# Patient Record
Sex: FEMALE | Race: WHITE | NOT HISPANIC OR LATINO | Employment: OTHER | URBAN - METROPOLITAN AREA
[De-identification: names, ages, dates, MRNs, and addresses within clinical notes are randomized per-mention and may not be internally consistent; named-entity substitution may affect disease eponyms.]

---

## 2022-12-13 ENCOUNTER — OFFICE VISIT (OUTPATIENT)
Dept: ORTHOPEDIC SURGERY | Facility: CLINIC | Age: 82
End: 2022-12-13

## 2022-12-13 VITALS — HEIGHT: 66 IN | WEIGHT: 138.1 LBS | RESPIRATION RATE: 12 BRPM | BODY MASS INDEX: 22.19 KG/M2 | TEMPERATURE: 97.1 F

## 2022-12-13 DIAGNOSIS — R52 PAIN: Primary | ICD-10-CM

## 2022-12-13 DIAGNOSIS — M17.12 PRIMARY OSTEOARTHRITIS OF LEFT KNEE: ICD-10-CM

## 2022-12-13 PROCEDURE — 73562 X-RAY EXAM OF KNEE 3: CPT | Performed by: ORTHOPAEDIC SURGERY

## 2022-12-13 PROCEDURE — 99203 OFFICE O/P NEW LOW 30 MIN: CPT | Performed by: ORTHOPAEDIC SURGERY

## 2022-12-13 RX ORDER — NEBIVOLOL 10 MG/1
10 TABLET ORAL DAILY
COMMUNITY
Start: 2022-12-08

## 2022-12-13 RX ORDER — APIXABAN 2.5 MG/1
2.5 TABLET, FILM COATED ORAL 2 TIMES DAILY
COMMUNITY
Start: 2022-12-05

## 2022-12-13 NOTE — PROGRESS NOTES
"Patient: Micki Vera  YOB: 1940 82 y.o. female  Medical Record Number: [id number]    Chief Complaints:   Chief Complaint   Patient presents with   • Left Knee - Pain, Initial Evaluation, Edema       History of Present Illness:Micki Vera is a 82 y.o. female who presents with left knee pain -  Severe lateral stabbing - on blood thinnners, cant take ensaids, takes tylenol without much relief. Has added a home remedy ointment and says the pain level is much better    Allergies:   Allergies   Allergen Reactions   • Hydrochlorothiazide W-Triamterene Hives     \"Rash\"   • Vancomycin Rash     Can take PO   Cannot take it as IV   • Aminophylline Hives   • Clindamycin Diarrhea   • Erythromycin Hives   • Sulfa Antibiotics Hives and Rash   • Theophylline Hives       Medications:   Current Outpatient Medications   Medication Sig Dispense Refill   • B Complex Vitamins (VITAMIN B COMPLEX PO) Take  by mouth.     • Biotin 5000 MCG capsule Take  by mouth.     • Cholecalciferol 25 MCG (1000 UT) capsule Take 2,000 Units by mouth Daily.     • Eliquis 2.5 MG tablet tablet Take 2.5 mg by mouth 2 (Two) Times a Day.     • Misc Natural Products (Magic Mushroom Mix) capsule Take  by mouth.     • nebivolol (BYSTOLIC) 10 MG tablet Take 10 mg by mouth Daily.       No current facility-administered medications for this visit.         The following portions of the patient's history were reviewed and updated as appropriate: allergies, current medications, past family history, past medical history, past social history, past surgical history and problem list.    Review of Systems:   A 14 point review of systems was performed. All systems negative except pertinent positives/negative listed in HPI above    Physical Exam:   Vitals:    12/13/22 1249   Resp: 12   Temp: 97.1 °F (36.2 °C)   Weight: 62.6 kg (138 lb 1.6 oz)   Height: 167.6 cm (66\")       General: A and O x 3, ASA, NAD    SCLERA:    Normal    DENTITION:   Normal  Knee:  " left    ALIGNMENT:     Valgus      GAIT:    Antalgic    SKIN:    No abnormality, very large varicose veins anterior    RANGE OF MOTION:   5  -  105   DEG    STRENGTH:   4  / 5    LIGAMENTS:    No varus / valgus instability.   Negative  Lachman.    MENISCUS:     Negative   Parish       DISTAL PULSES:    Paplable    DISTAL SENSATION :   Intact    LYMPHATICS:     No   lymphadenopathy    OTHER:          - Positive   effusion      - Crepitance with ROM        Radiology:  Xrays 3views left knee (ap,lateral, sunrise) were ordered and reviewed for evaluation of knee pain demonstrating advanced valgus osteoarthritis with bone on bone articulation, subchondral cysts, and periarticular osteophytes. There are no previous films for comparision.    Assessment/Plan: left knee advanced OA  A total of  25 minutes was spent face to face with >13 minutes being spent in counseling and discussion of joint replacement surgery.  We discussed the risks benefits and alternatives of the procedure.  I explained the proposed surgical procedure in detail and also discussed the expected hospital course, discharge course and postoperative treatment plan in detail.I think she may be at increased risk due larghe anterior varicose veins and h/o PE - I recommend she see a vascualr specialist to see if any intervention could help address the veins before considering surgery  .      Alan Walton MD  12/13/2022

## 2023-03-24 ENCOUNTER — TELEPHONE (OUTPATIENT)
Dept: ORTHOPEDIC SURGERY | Facility: CLINIC | Age: 83
End: 2023-03-24
Payer: MEDICARE

## 2023-03-24 NOTE — TELEPHONE ENCOUNTER
Caller: Micki Vera    Relationship: Self    Best call back number:     What is the best time to reach you: ANY     Who are you requesting to speak with (clinical staff, provider,  specific staff member): CLINICAL    What was the call regarding: PATIENT SAYS VASCULAR SHOULD BE CLEAR TO MOVE FORWARD WITH LEFT KNEE SURGERY.  PLEASE CONTACT PATIENT TO DISCUSS    Do you require a callback: YES

## 2023-03-28 ENCOUNTER — PREP FOR SURGERY (OUTPATIENT)
Dept: OTHER | Facility: HOSPITAL | Age: 83
End: 2023-03-28
Payer: MEDICARE

## 2023-03-28 DIAGNOSIS — M17.12 PRIMARY OSTEOARTHRITIS OF LEFT KNEE: Primary | ICD-10-CM

## 2023-03-28 PROBLEM — M17.9 OA (OSTEOARTHRITIS) OF KNEE: Status: ACTIVE | Noted: 2023-03-28

## 2023-03-28 RX ORDER — CHLORHEXIDINE GLUCONATE 500 MG/1
CLOTH TOPICAL 2 TIMES DAILY
OUTPATIENT
Start: 2023-03-28

## 2023-03-28 RX ORDER — PREGABALIN 75 MG/1
150 CAPSULE ORAL ONCE
OUTPATIENT
Start: 2023-06-21 | End: 2023-03-28

## 2023-03-28 RX ORDER — CEFAZOLIN SODIUM 2 G/100ML
2 INJECTION, SOLUTION INTRAVENOUS ONCE
OUTPATIENT
Start: 2023-06-21 | End: 2023-03-28

## 2023-03-28 RX ORDER — MELOXICAM 15 MG/1
15 TABLET ORAL ONCE
OUTPATIENT
Start: 2023-06-21 | End: 2023-03-28

## 2023-04-27 ENCOUNTER — PRE-ADMISSION TESTING (OUTPATIENT)
Dept: PREADMISSION TESTING | Facility: HOSPITAL | Age: 83
End: 2023-04-27
Payer: MEDICARE

## 2023-04-27 ENCOUNTER — HOSPITAL ENCOUNTER (OUTPATIENT)
Dept: GENERAL RADIOLOGY | Facility: HOSPITAL | Age: 83
Discharge: HOME | End: 2023-04-27
Payer: MEDICARE

## 2023-04-27 VITALS
DIASTOLIC BLOOD PRESSURE: 95 MMHG | BODY MASS INDEX: 23.06 KG/M2 | HEART RATE: 75 BPM | RESPIRATION RATE: 16 BRPM | WEIGHT: 143.5 LBS | OXYGEN SATURATION: 100 % | HEIGHT: 66 IN | TEMPERATURE: 98.6 F | SYSTOLIC BLOOD PRESSURE: 168 MMHG

## 2023-04-27 LAB
ALBUMIN SERPL-MCNC: 4.1 G/DL (ref 3.5–5.2)
ALBUMIN/GLOB SERPL: 1.4 G/DL
ALP SERPL-CCNC: 91 U/L (ref 39–117)
ALT SERPL W P-5'-P-CCNC: 22 U/L (ref 1–33)
ANION GAP SERPL CALCULATED.3IONS-SCNC: 12.3 MMOL/L (ref 5–15)
AST SERPL-CCNC: 26 U/L (ref 1–32)
BILIRUB SERPL-MCNC: 0.2 MG/DL (ref 0–1.2)
BUN SERPL-MCNC: 16 MG/DL (ref 8–23)
BUN/CREAT SERPL: 20 (ref 7–25)
CALCIUM SPEC-SCNC: 9.8 MG/DL (ref 8.6–10.5)
CHLORIDE SERPL-SCNC: 100 MMOL/L (ref 98–107)
CO2 SERPL-SCNC: 23.7 MMOL/L (ref 22–29)
CREAT SERPL-MCNC: 0.8 MG/DL (ref 0.57–1)
DEPRECATED RDW RBC AUTO: 44 FL (ref 37–54)
EGFRCR SERPLBLD CKD-EPI 2021: 72.3 ML/MIN/1.73
ERYTHROCYTE [DISTWIDTH] IN BLOOD BY AUTOMATED COUNT: 13.3 % (ref 12.3–15.4)
GLOBULIN UR ELPH-MCNC: 2.9 GM/DL
GLUCOSE SERPL-MCNC: 108 MG/DL (ref 65–99)
HBA1C MFR BLD: 5.8 % (ref 4.8–5.6)
HCT VFR BLD AUTO: 38.4 % (ref 34–46.6)
HGB BLD-MCNC: 12.5 G/DL (ref 12–15.9)
INR PPP: 1.18 (ref 0.9–1.1)
MCH RBC QN AUTO: 29.4 PG (ref 26.6–33)
MCHC RBC AUTO-ENTMCNC: 32.6 G/DL (ref 31.5–35.7)
MCV RBC AUTO: 90.4 FL (ref 79–97)
PLATELET # BLD AUTO: 187 10*3/MM3 (ref 140–450)
PMV BLD AUTO: 10.5 FL (ref 6–12)
POTASSIUM SERPL-SCNC: 4.3 MMOL/L (ref 3.5–5.2)
PROT SERPL-MCNC: 7 G/DL (ref 6–8.5)
PROTHROMBIN TIME: 15.2 SECONDS (ref 11.7–14.2)
QT INTERVAL: 398 MS
RBC # BLD AUTO: 4.25 10*6/MM3 (ref 3.77–5.28)
SODIUM SERPL-SCNC: 136 MMOL/L (ref 136–145)
WBC NRBC COR # BLD: 7.38 10*3/MM3 (ref 3.4–10.8)

## 2023-04-27 PROCEDURE — 85027 COMPLETE CBC AUTOMATED: CPT

## 2023-04-27 PROCEDURE — 73560 X-RAY EXAM OF KNEE 1 OR 2: CPT

## 2023-04-27 PROCEDURE — 36415 COLL VENOUS BLD VENIPUNCTURE: CPT

## 2023-04-27 PROCEDURE — 85610 PROTHROMBIN TIME: CPT

## 2023-04-27 PROCEDURE — 93005 ELECTROCARDIOGRAM TRACING: CPT

## 2023-04-27 PROCEDURE — 80053 COMPREHEN METABOLIC PANEL: CPT

## 2023-04-27 PROCEDURE — 83036 HEMOGLOBIN GLYCOSYLATED A1C: CPT

## 2023-04-27 PROCEDURE — 93010 ELECTROCARDIOGRAM REPORT: CPT | Performed by: STUDENT IN AN ORGANIZED HEALTH CARE EDUCATION/TRAINING PROGRAM

## 2023-04-27 RX ORDER — CLONAZEPAM 1 MG/1
TABLET ORAL
COMMUNITY
Start: 2023-03-01

## 2023-04-27 RX ORDER — ACETAMINOPHEN AND CODEINE PHOSPHATE 300; 60 MG/1; MG/1
TABLET ORAL
COMMUNITY
Start: 2023-04-17 | End: 2023-04-27

## 2023-04-27 RX ORDER — SENNOSIDES 8.6 MG
650 CAPSULE ORAL EVERY 8 HOURS PRN
COMMUNITY

## 2023-04-27 RX ORDER — MULTIPLE VITAMINS W/ MINERALS TAB 9MG-400MCG
1 TAB ORAL DAILY
COMMUNITY

## 2023-04-27 RX ORDER — NITROGLYCERIN 0.4 MG/1
0.4 TABLET SUBLINGUAL
COMMUNITY
Start: 2022-12-27

## 2023-04-27 RX ORDER — FLUTICASONE FUROATE AND VILANTEROL 100; 25 UG/1; UG/1
1 POWDER RESPIRATORY (INHALATION)
COMMUNITY

## 2023-04-27 RX ORDER — ATORVASTATIN CALCIUM 40 MG/1
40 TABLET, FILM COATED ORAL NIGHTLY
COMMUNITY
Start: 2023-04-11 | End: 2023-04-27

## 2023-04-27 RX ORDER — LORATADINE 10 MG/1
10 TABLET ORAL DAILY
COMMUNITY

## 2023-04-27 RX ORDER — CHLORHEXIDINE GLUCONATE 500 MG/1
CLOTH TOPICAL TAKE AS DIRECTED
COMMUNITY

## 2023-04-27 RX ORDER — AMOXICILLIN 875 MG/1
TABLET, COATED ORAL
COMMUNITY
Start: 2023-04-24

## 2023-04-27 RX ORDER — SALIVA STIMULANT COMB. NO.7
GEL (GRAM) MUCOUS MEMBRANE AS NEEDED
COMMUNITY

## 2023-04-27 NOTE — DISCHARGE INSTRUCTIONS
Take the following medications the morning of surgery: NEBIVOLOL, BREO      If you are on prescription narcotic pain medication to control your pain you may also take that medication the morning of surgery.    General Instructions:  Do not eat solid food after midnight the night before surgery.  You may drink clear liquids day of surgery but must stop at least one hour before your hospital arrival time.  It is beneficial for you to have a clear drink that contains carbohydrates the day of surgery.  We suggest a 12 to 20 ounce bottle of Gatorade or Powerade for non-diabetic patients or a 12 to 20 ounce bottle of G2 or Powerade Zero for diabetic patients. (Pediatric patients, are not advised to drink a 12 to 20 ounce carbohydrate drink)    Clear liquids are liquids you can see through.  Nothing red in color.     Plain water                               Sports drinks  Sodas                                   Gelatin (Jell-O)  Fruit juices without pulp such as white grape juice and apple juice  Popsicles that contain no fruit or yogurt  Tea or coffee (no cream or milk added)  Gatorade / Powerade  G2 / Powerade Zero      Patients who avoid smoking, chewing tobacco and alcohol for 4 weeks prior to surgery have a reduced risk of post-operative complications.  Quit smoking as many days before surgery as you can.  Do not smoke, use chewing tobacco or drink alcohol the day of surgery.   If applicable bring your C-PAP/ BI-PAP machine in with you to preop day of surgery.  Bring any papers given to you in the doctor’s office.  Wear clean comfortable clothes.  Do not wear contact lenses, false eyelashes or make-up.  Bring a case for your glasses.   Bring crutches or walker if applicable.  Remove all piercings.  Leave jewelry and any other valuables at home.  Hair extensions with metal clips must be removed prior to surgery.  The Pre-Admission Testing nurse will instruct you to bring medications if unable to obtain an accurate  list in Pre-Admission Testing.          Preventing a Surgical Site Infection:  For 2 to 3 days before surgery, avoid shaving with a razor because the razor can irritate skin and make it easier to develop an infection.    Any areas of open skin can increase the risk of a post-operative wound infection by allowing bacteria to enter and travel throughout the body.  Notify your surgeon if you have any skin wounds / rashes even if it is not near the expected surgical site.  The area will need assessed to determine if surgery should be delayed until it is healed.  The night prior to surgery shower using a fresh bar of anti-bacterial soap (such as Dial) and clean washcloth.  Sleep in a clean bed with clean clothing.  Do not allow pets to sleep with you.  Shower on the morning of surgery using a fresh bar of anti-bacterial soap (such as Dial) and clean washcloth.  Dry with a clean towel and dress in clean clothing.  CHLORHEXIDINE CLOTH INSTRUCTIONS  The morning of surgery follow these instructions using the Chlorhexidine cloths you've been given.  These steps reduce bacteria on the body.  Do not use the cloths near your eyes, ears mouth, genitalia or on open wounds.  Throw the cloths away after use but do not try to flush them down a toilet.      Open and remove one cloth at a time from the package.    Leave the cloth unfolded and begin the bathing.  Massage the skin with the cloths using gentle pressure to remove bacteria.  Do not scrub harshly.   Follow the steps below with one 2% CHG cloth per area (6 total cloths).  One cloth for neck, shoulders and chest.  One cloth for both arms, hands, fingers and underarms (do underarms last).  One cloth for the abdomen followed by groin.  One cloth for right leg and foot including between the toes.  One cloth for left leg and foot including between the toes.  The last cloth is to be used for the back of the neck, back and buttocks.    Allow the CHG to air dry 3 minutes on the skin  which will give it time to work and decrease the chance of irritation.  The skin may feel sticky until it is dry.  Do not rinse with water or any other liquid or you will lose the beneficial effects of the CHG.  If mild skin irritation occurs, do rinse the skin to remove the CHG.  Report this to the nurse at time of admission.  Do not apply lotions, creams, ointments, deodorants or perfumes after using the clothes. Dress in clean clothes before coming to the hospital.     Ask your surgeon if you will be receiving antibiotics prior to surgery.  Make sure you, your family, and all healthcare providers clean their hands with soap and water or an alcohol based hand  before caring for you or your wound.    Day of surgery:  Your arrival time is approximately two hours before your scheduled surgery time.  Upon arrival, a Pre-op nurse and Anesthesiologist will review your health history, obtain vital signs, and answer questions you may have.  The only belongings needed at this time will be a list of your home medications and if applicable your C-PAP/BI-PAP machine.  A Pre-op nurse will start an IV and you may receive medication in preparation for surgery, including something to help you relax.     Please be aware that surgery does come with discomfort.  We want to make every effort to control your discomfort so please discuss any uncontrolled symptoms with your nurse.   Your doctor will most likely have prescribed pain medications.      If you are going home after surgery you will receive individualized written care instructions before being discharged.  A responsible adult must drive you to and from the hospital on the day of your surgery and stay with you for 24 hours.  Discharge prescriptions can be filled by the hospital pharmacy during regular pharmacy hours.  If you are having surgery late in the day/evening your prescription may be e-prescribed to your pharmacy.  Please verify your pharmacy hours or chose a  24 hour pharmacy to avoid not having access to your prescription because your pharmacy has closed for the day.    If you are staying overnight following surgery, you will be transported to your hospital room following the recovery period.  [facility] has all private rooms.    If you have any questions please call Pre-Admission Testing at [phone].  Deductibles and co-payments are collected on the day of service. Please be prepared to pay the required co-pay, deductible or deposit on the day of service as defined by your plan.    Call your surgeon immediately if you experience any of the following symptoms:  Sore Throat  Shortness of Breath or difficulty breathing  Cough  Chills  Body soreness or muscle pain  Headache  Fever  New loss of taste or smell  Do not arrive for your surgery ill.  Your procedure will need to be rescheduled to another time.  You will need to call your physician before the day of surgery to avoid any unnecessary exposure to hospital staff as well as other patients.

## 2023-05-01 NOTE — PAT
Here for PAT appt for scheduled LKTA with Dr Lorenz.  She has an extensive surgical hx but this is her first TJR.  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  PMH includes CAD, severe AS, pulm HTN, DVT/PE, asthma, HTN, hyperlipidemia, OA and PVD.  She has had cardiac stents as well as a TAVR and is on anti coag therapy for same.  Was cleared from a cardiac perspective by Dr Gaona (note in Care Everywhere) at high risk, but stable.  She was instructed to hold her Eliquis for 2 days prior to surgery.  Exam reveals pleasant elderly female in NAD.  Answers questions appropriately and neuro grossly intact.  Very informed on her health issues.  S1S2 RRR with murmur noted.  Chest CTA bilat although slightly harsh.  No edema noted.    All testing reviewed and cardiac notes reviewed.  Pt may proceed with planned ortho surgery.

## 2023-05-17 ENCOUNTER — APPOINTMENT (OUTPATIENT)
Dept: GENERAL RADIOLOGY | Facility: HOSPITAL | Age: 83
End: 2023-05-17
Payer: COMMERCIAL

## 2023-05-17 ENCOUNTER — ANESTHESIA EVENT (OUTPATIENT)
Dept: PERIOP | Facility: HOSPITAL | Age: 83
End: 2023-05-17
Payer: COMMERCIAL

## 2023-05-17 ENCOUNTER — HOSPITAL ENCOUNTER (OUTPATIENT)
Facility: HOSPITAL | Age: 83
Discharge: HOME HEALTH | End: 2023-05-18
Attending: ORTHOPAEDIC SURGERY | Admitting: ORTHOPAEDIC SURGERY
Payer: COMMERCIAL

## 2023-05-17 ENCOUNTER — ANESTHESIA (OUTPATIENT)
Dept: PERIOP | Facility: HOSPITAL | Age: 83
End: 2023-05-17
Payer: COMMERCIAL

## 2023-05-17 DIAGNOSIS — Z96.652 TOTAL KNEE REPLACEMENT STATUS, LEFT: Primary | ICD-10-CM

## 2023-05-17 PROCEDURE — 25010000002 CEFAZOLIN IN DEXTROSE 2-4 GM/100ML-% SOLUTION: Performed by: ORTHOPAEDIC SURGERY

## 2023-05-17 PROCEDURE — G0378 HOSPITAL OBSERVATION PER HR: HCPCS

## 2023-05-17 PROCEDURE — C1713 ANCHOR/SCREW BN/BN,TIS/BN: HCPCS | Performed by: ORTHOPAEDIC SURGERY

## 2023-05-17 PROCEDURE — 73560 X-RAY EXAM OF KNEE 1 OR 2: CPT

## 2023-05-17 PROCEDURE — 25010000002 KETOROLAC TROMETHAMINE PER 15 MG: Performed by: ORTHOPAEDIC SURGERY

## 2023-05-17 PROCEDURE — 25010000002 ONDANSETRON PER 1 MG: Performed by: ORTHOPAEDIC SURGERY

## 2023-05-17 PROCEDURE — 25010000002 ROPIVACAINE PER 1 MG: Performed by: ORTHOPAEDIC SURGERY

## 2023-05-17 PROCEDURE — 25010000002 ROPIVACAINE PER 1 MG: Performed by: STUDENT IN AN ORGANIZED HEALTH CARE EDUCATION/TRAINING PROGRAM

## 2023-05-17 PROCEDURE — 25010000002 ONDANSETRON PER 1 MG: Performed by: STUDENT IN AN ORGANIZED HEALTH CARE EDUCATION/TRAINING PROGRAM

## 2023-05-17 PROCEDURE — A9270 NON-COVERED ITEM OR SERVICE: HCPCS | Performed by: ORTHOPAEDIC SURGERY

## 2023-05-17 PROCEDURE — 25010000002 DEXAMETHASONE PER 1 MG: Performed by: STUDENT IN AN ORGANIZED HEALTH CARE EDUCATION/TRAINING PROGRAM

## 2023-05-17 PROCEDURE — 63710000001 HYDROCODONE-ACETAMINOPHEN 5-325 MG TABLET: Performed by: ORTHOPAEDIC SURGERY

## 2023-05-17 PROCEDURE — 25010000002 FENTANYL CITRATE (PF) 50 MCG/ML SOLUTION: Performed by: STUDENT IN AN ORGANIZED HEALTH CARE EDUCATION/TRAINING PROGRAM

## 2023-05-17 PROCEDURE — C1776 JOINT DEVICE (IMPLANTABLE): HCPCS | Performed by: ORTHOPAEDIC SURGERY

## 2023-05-17 PROCEDURE — 25010000002 CLONIDINE PER 1 MG: Performed by: ORTHOPAEDIC SURGERY

## 2023-05-17 PROCEDURE — 25010000002 PROPOFOL 10 MG/ML EMULSION: Performed by: STUDENT IN AN ORGANIZED HEALTH CARE EDUCATION/TRAINING PROGRAM

## 2023-05-17 DEVICE — DEV CONTRL TISS STRATAFIX SPIRAL MNCRYL UD 3/0 PLS 30CM: Type: IMPLANTABLE DEVICE | Site: KNEE | Status: FUNCTIONAL

## 2023-05-17 DEVICE — BONE WAX
Type: IMPLANTABLE DEVICE | Site: KNEE | Status: FUNCTIONAL
Brand: ETHICON

## 2023-05-17 DEVICE — PALACOS® R IS A FAST-CURING, RADIOPAQUE, POLY(METHYL METHACRYLATE)-BASED BONE CEMENT.PALACOS ® R CONTAINS THE X-RAY CONTRAST MEDIUM ZIRCONIUM DIOXIDE. TO IMPROVE VISIBILITY IN THE SURGICAL FIELD PALACOS ® R HAS BEEN COLOURED WITH CHLOROPHYLL (E141). THE BONE CEMENT IS PREPARED DIRECTLY BEFORE USE BY MIXING A POLYMER POWDER COMPONENT WITH A LIQUID MONOMER COMPONENT. A DUCTILE DOUGH FORMS WHICH CURES WITHIN A FEW MINUTES.
Type: IMPLANTABLE DEVICE | Site: KNEE | Status: FUNCTIONAL
Brand: PALACOS®

## 2023-05-17 DEVICE — TIBIAL INSERT FIXED SPHERE FLEX   #4/11 MM L E-CROSS
Type: IMPLANTABLE DEVICE | Site: KNEE | Status: FUNCTIONAL
Brand: GMK SPHERE TOTAL KNEE SYSTEM

## 2023-05-17 DEVICE — TOTL KN: Type: IMPLANTABLE DEVICE | Status: FUNCTIONAL

## 2023-05-17 DEVICE — DEV CONTRL TISS STRATAFIX PDS PLS OS6 REV SZ1 18IN 45CM: Type: IMPLANTABLE DEVICE | Site: KNEE | Status: FUNCTIONAL

## 2023-05-17 DEVICE — RESURFACING PATELLA SIZE 2
Type: IMPLANTABLE DEVICE | Site: KNEE | Status: FUNCTIONAL
Brand: GMK PRIMARY TOTAL KNEE SYSTEM

## 2023-05-17 DEVICE — TIBIAL TRAY FIXED CEMENTED SIZE T3-I4 LEFT
Type: IMPLANTABLE DEVICE | Site: KNEE | Status: FUNCTIONAL
Brand: GMK SPHERE TOTAL KNEE SYSTEM

## 2023-05-17 DEVICE — FEMUR SPHERE CEMENTED LEFT, SIZE 4
Type: IMPLANTABLE DEVICE | Site: KNEE | Status: FUNCTIONAL
Brand: GMK SPHERE TOTAL KNEE SYSTEM

## 2023-05-17 RX ORDER — LIDOCAINE HYDROCHLORIDE 20 MG/ML
INJECTION, SOLUTION EPIDURAL; INFILTRATION; INTRACAUDAL; PERINEURAL AS NEEDED
Status: DISCONTINUED | OUTPATIENT
Start: 2023-05-17 | End: 2023-05-17 | Stop reason: SURG

## 2023-05-17 RX ORDER — HYDROCODONE BITARTRATE AND ACETAMINOPHEN 5; 325 MG/1; MG/1
1 TABLET ORAL ONCE AS NEEDED
Status: DISCONTINUED | OUTPATIENT
Start: 2023-05-17 | End: 2023-05-17 | Stop reason: HOSPADM

## 2023-05-17 RX ORDER — ONDANSETRON 4 MG/1
4 TABLET, FILM COATED ORAL EVERY 6 HOURS PRN
Status: DISCONTINUED | OUTPATIENT
Start: 2023-05-17 | End: 2023-05-18 | Stop reason: HOSPADM

## 2023-05-17 RX ORDER — NEBIVOLOL 10 MG/1
10 TABLET ORAL DAILY
Status: DISCONTINUED | OUTPATIENT
Start: 2023-05-17 | End: 2023-05-18 | Stop reason: HOSPADM

## 2023-05-17 RX ORDER — HYDRALAZINE HYDROCHLORIDE 20 MG/ML
5 INJECTION INTRAMUSCULAR; INTRAVENOUS
Status: DISCONTINUED | OUTPATIENT
Start: 2023-05-17 | End: 2023-05-17 | Stop reason: HOSPADM

## 2023-05-17 RX ORDER — DROPERIDOL 2.5 MG/ML
0.62 INJECTION, SOLUTION INTRAMUSCULAR; INTRAVENOUS
Status: DISCONTINUED | OUTPATIENT
Start: 2023-05-17 | End: 2023-05-17 | Stop reason: HOSPADM

## 2023-05-17 RX ORDER — FENTANYL CITRATE 50 UG/ML
25 INJECTION, SOLUTION INTRAMUSCULAR; INTRAVENOUS ONCE AS NEEDED
Status: COMPLETED | OUTPATIENT
Start: 2023-05-17 | End: 2023-05-17

## 2023-05-17 RX ORDER — CLONAZEPAM 0.5 MG/1
0.5 TABLET ORAL NIGHTLY PRN
Status: DISCONTINUED | OUTPATIENT
Start: 2023-05-17 | End: 2023-05-18 | Stop reason: HOSPADM

## 2023-05-17 RX ORDER — HYDROCODONE BITARTRATE AND ACETAMINOPHEN 5; 325 MG/1; MG/1
2 TABLET ORAL EVERY 4 HOURS PRN
Status: DISCONTINUED | OUTPATIENT
Start: 2023-05-17 | End: 2023-05-18 | Stop reason: HOSPADM

## 2023-05-17 RX ORDER — MAGNESIUM HYDROXIDE 1200 MG/15ML
LIQUID ORAL AS NEEDED
Status: DISCONTINUED | OUTPATIENT
Start: 2023-05-17 | End: 2023-05-17 | Stop reason: HOSPADM

## 2023-05-17 RX ORDER — PREGABALIN 75 MG/1
75 CAPSULE ORAL ONCE
Status: COMPLETED | OUTPATIENT
Start: 2023-05-17 | End: 2023-05-17

## 2023-05-17 RX ORDER — SODIUM CHLORIDE 0.9 % (FLUSH) 0.9 %
3 SYRINGE (ML) INJECTION EVERY 12 HOURS SCHEDULED
Status: DISCONTINUED | OUTPATIENT
Start: 2023-05-17 | End: 2023-05-17 | Stop reason: HOSPADM

## 2023-05-17 RX ORDER — MELATONIN
2000 DAILY
Status: DISCONTINUED | OUTPATIENT
Start: 2023-05-17 | End: 2023-05-18 | Stop reason: HOSPADM

## 2023-05-17 RX ORDER — BUPIVACAINE HYDROCHLORIDE 7.5 MG/ML
INJECTION, SOLUTION EPIDURAL; RETROBULBAR
Status: COMPLETED | OUTPATIENT
Start: 2023-05-17 | End: 2023-05-17

## 2023-05-17 RX ORDER — CELECOXIB 200 MG/1
200 CAPSULE ORAL ONCE
Status: COMPLETED | OUTPATIENT
Start: 2023-05-17 | End: 2023-05-17

## 2023-05-17 RX ORDER — DIPHENHYDRAMINE HYDROCHLORIDE 50 MG/ML
12.5 INJECTION INTRAMUSCULAR; INTRAVENOUS
Status: DISCONTINUED | OUTPATIENT
Start: 2023-05-17 | End: 2023-05-17 | Stop reason: HOSPADM

## 2023-05-17 RX ORDER — IPRATROPIUM BROMIDE AND ALBUTEROL SULFATE 2.5; .5 MG/3ML; MG/3ML
3 SOLUTION RESPIRATORY (INHALATION) ONCE AS NEEDED
Status: DISCONTINUED | OUTPATIENT
Start: 2023-05-17 | End: 2023-05-17 | Stop reason: HOSPADM

## 2023-05-17 RX ORDER — CETIRIZINE HYDROCHLORIDE 10 MG/1
10 TABLET ORAL DAILY
Status: DISCONTINUED | OUTPATIENT
Start: 2023-05-17 | End: 2023-05-18 | Stop reason: HOSPADM

## 2023-05-17 RX ORDER — TRANEXAMIC ACID 100 MG/ML
INJECTION, SOLUTION INTRAVENOUS AS NEEDED
Status: DISCONTINUED | OUTPATIENT
Start: 2023-05-17 | End: 2023-05-17 | Stop reason: SURG

## 2023-05-17 RX ORDER — ONDANSETRON 2 MG/ML
4 INJECTION INTRAMUSCULAR; INTRAVENOUS EVERY 6 HOURS PRN
Status: DISCONTINUED | OUTPATIENT
Start: 2023-05-17 | End: 2023-05-18 | Stop reason: HOSPADM

## 2023-05-17 RX ORDER — SODIUM CHLORIDE 0.9 % (FLUSH) 0.9 %
3-10 SYRINGE (ML) INJECTION AS NEEDED
Status: DISCONTINUED | OUTPATIENT
Start: 2023-05-17 | End: 2023-05-17 | Stop reason: HOSPADM

## 2023-05-17 RX ORDER — CEFAZOLIN SODIUM 2 G/100ML
2 INJECTION, SOLUTION INTRAVENOUS ONCE
Status: COMPLETED | OUTPATIENT
Start: 2023-05-17 | End: 2023-05-17

## 2023-05-17 RX ORDER — PROMETHAZINE HYDROCHLORIDE 25 MG/1
25 TABLET ORAL ONCE AS NEEDED
Status: DISCONTINUED | OUTPATIENT
Start: 2023-05-17 | End: 2023-05-17 | Stop reason: HOSPADM

## 2023-05-17 RX ORDER — NITROGLYCERIN 0.4 MG/1
0.4 TABLET SUBLINGUAL
Status: DISCONTINUED | OUTPATIENT
Start: 2023-05-17 | End: 2023-05-18 | Stop reason: HOSPADM

## 2023-05-17 RX ORDER — NALOXONE HCL 0.4 MG/ML
0.1 VIAL (ML) INJECTION
Status: DISCONTINUED | OUTPATIENT
Start: 2023-05-17 | End: 2023-05-18 | Stop reason: HOSPADM

## 2023-05-17 RX ORDER — PREGABALIN 75 MG/1
150 CAPSULE ORAL ONCE
Status: DISCONTINUED | OUTPATIENT
Start: 2023-05-17 | End: 2023-05-17

## 2023-05-17 RX ORDER — SODIUM CHLORIDE 0.9 % (FLUSH) 0.9 %
3 SYRINGE (ML) INJECTION EVERY 12 HOURS SCHEDULED
Status: DISCONTINUED | OUTPATIENT
Start: 2023-05-17 | End: 2023-05-17

## 2023-05-17 RX ORDER — BUDESONIDE AND FORMOTEROL FUMARATE DIHYDRATE 160; 4.5 UG/1; UG/1
1 AEROSOL RESPIRATORY (INHALATION)
Status: DISCONTINUED | OUTPATIENT
Start: 2023-05-17 | End: 2023-05-18 | Stop reason: HOSPADM

## 2023-05-17 RX ORDER — EPHEDRINE SULFATE 50 MG/ML
5 INJECTION, SOLUTION INTRAVENOUS ONCE AS NEEDED
Status: DISCONTINUED | OUTPATIENT
Start: 2023-05-17 | End: 2023-05-17 | Stop reason: HOSPADM

## 2023-05-17 RX ORDER — CEFAZOLIN SODIUM 2 G/100ML
2 INJECTION, SOLUTION INTRAVENOUS EVERY 8 HOURS
Status: COMPLETED | OUTPATIENT
Start: 2023-05-17 | End: 2023-05-18

## 2023-05-17 RX ORDER — SODIUM CHLORIDE, SODIUM LACTATE, POTASSIUM CHLORIDE, CALCIUM CHLORIDE 600; 310; 30; 20 MG/100ML; MG/100ML; MG/100ML; MG/100ML
9 INJECTION, SOLUTION INTRAVENOUS CONTINUOUS
Status: DISCONTINUED | OUTPATIENT
Start: 2023-05-17 | End: 2023-05-17

## 2023-05-17 RX ORDER — SODIUM CHLORIDE 9 MG/ML
100 INJECTION, SOLUTION INTRAVENOUS CONTINUOUS
Status: DISCONTINUED | OUTPATIENT
Start: 2023-05-17 | End: 2023-05-18 | Stop reason: HOSPADM

## 2023-05-17 RX ORDER — ACETAMINOPHEN 500 MG
1000 TABLET ORAL ONCE
Status: COMPLETED | OUTPATIENT
Start: 2023-05-17 | End: 2023-05-17

## 2023-05-17 RX ORDER — FENTANYL CITRATE 50 UG/ML
25 INJECTION, SOLUTION INTRAMUSCULAR; INTRAVENOUS
Status: DISCONTINUED | OUTPATIENT
Start: 2023-05-17 | End: 2023-05-17 | Stop reason: HOSPADM

## 2023-05-17 RX ORDER — TRAMADOL HYDROCHLORIDE 50 MG/1
50 TABLET ORAL EVERY 8 HOURS PRN
Status: DISCONTINUED | OUTPATIENT
Start: 2023-05-17 | End: 2023-05-18 | Stop reason: HOSPADM

## 2023-05-17 RX ORDER — ROPIVACAINE HYDROCHLORIDE 5 MG/ML
INJECTION, SOLUTION EPIDURAL; INFILTRATION; PERINEURAL
Status: COMPLETED | OUTPATIENT
Start: 2023-05-17 | End: 2023-05-17

## 2023-05-17 RX ORDER — PROMETHAZINE HYDROCHLORIDE 25 MG/1
25 SUPPOSITORY RECTAL ONCE AS NEEDED
Status: DISCONTINUED | OUTPATIENT
Start: 2023-05-17 | End: 2023-05-17 | Stop reason: HOSPADM

## 2023-05-17 RX ORDER — SODIUM CHLORIDE 0.9 % (FLUSH) 0.9 %
3-10 SYRINGE (ML) INJECTION AS NEEDED
Status: DISCONTINUED | OUTPATIENT
Start: 2023-05-17 | End: 2023-05-17

## 2023-05-17 RX ORDER — LABETALOL HYDROCHLORIDE 5 MG/ML
5 INJECTION, SOLUTION INTRAVENOUS
Status: DISCONTINUED | OUTPATIENT
Start: 2023-05-17 | End: 2023-05-17 | Stop reason: HOSPADM

## 2023-05-17 RX ORDER — HYDROMORPHONE HYDROCHLORIDE 1 MG/ML
0.25 INJECTION, SOLUTION INTRAMUSCULAR; INTRAVENOUS; SUBCUTANEOUS
Status: DISCONTINUED | OUTPATIENT
Start: 2023-05-17 | End: 2023-05-17 | Stop reason: HOSPADM

## 2023-05-17 RX ORDER — NALOXONE HCL 0.4 MG/ML
0.2 VIAL (ML) INJECTION AS NEEDED
Status: DISCONTINUED | OUTPATIENT
Start: 2023-05-17 | End: 2023-05-17 | Stop reason: HOSPADM

## 2023-05-17 RX ORDER — DOCUSATE SODIUM 100 MG/1
100 CAPSULE, LIQUID FILLED ORAL 2 TIMES DAILY PRN
Status: DISCONTINUED | OUTPATIENT
Start: 2023-05-17 | End: 2023-05-18 | Stop reason: HOSPADM

## 2023-05-17 RX ORDER — LIDOCAINE HYDROCHLORIDE 10 MG/ML
0.5 INJECTION, SOLUTION EPIDURAL; INFILTRATION; INTRACAUDAL; PERINEURAL ONCE AS NEEDED
Status: DISCONTINUED | OUTPATIENT
Start: 2023-05-17 | End: 2023-05-17

## 2023-05-17 RX ORDER — HYDROCODONE BITARTRATE AND ACETAMINOPHEN 5; 325 MG/1; MG/1
1 TABLET ORAL EVERY 4 HOURS PRN
Status: DISCONTINUED | OUTPATIENT
Start: 2023-05-17 | End: 2023-05-18 | Stop reason: HOSPADM

## 2023-05-17 RX ORDER — HYDROMORPHONE HYDROCHLORIDE 1 MG/ML
0.5 INJECTION, SOLUTION INTRAMUSCULAR; INTRAVENOUS; SUBCUTANEOUS
Status: DISCONTINUED | OUTPATIENT
Start: 2023-05-17 | End: 2023-05-18 | Stop reason: HOSPADM

## 2023-05-17 RX ORDER — ONDANSETRON 2 MG/ML
4 INJECTION INTRAMUSCULAR; INTRAVENOUS ONCE AS NEEDED
Status: DISCONTINUED | OUTPATIENT
Start: 2023-05-17 | End: 2023-05-17 | Stop reason: HOSPADM

## 2023-05-17 RX ORDER — ONDANSETRON 2 MG/ML
INJECTION INTRAMUSCULAR; INTRAVENOUS AS NEEDED
Status: DISCONTINUED | OUTPATIENT
Start: 2023-05-17 | End: 2023-05-17 | Stop reason: SURG

## 2023-05-17 RX ORDER — LIDOCAINE HYDROCHLORIDE 10 MG/ML
0.5 INJECTION, SOLUTION EPIDURAL; INFILTRATION; INTRACAUDAL; PERINEURAL ONCE AS NEEDED
Status: DISCONTINUED | OUTPATIENT
Start: 2023-05-17 | End: 2023-05-17 | Stop reason: HOSPADM

## 2023-05-17 RX ORDER — DEXAMETHASONE SODIUM PHOSPHATE 4 MG/ML
INJECTION, SOLUTION INTRA-ARTICULAR; INTRALESIONAL; INTRAMUSCULAR; INTRAVENOUS; SOFT TISSUE
Status: COMPLETED | OUTPATIENT
Start: 2023-05-17 | End: 2023-05-17

## 2023-05-17 RX ORDER — FLUMAZENIL 0.1 MG/ML
0.2 INJECTION INTRAVENOUS AS NEEDED
Status: DISCONTINUED | OUTPATIENT
Start: 2023-05-17 | End: 2023-05-17 | Stop reason: HOSPADM

## 2023-05-17 RX ORDER — PROPOFOL 10 MG/ML
VIAL (ML) INTRAVENOUS CONTINUOUS PRN
Status: DISCONTINUED | OUTPATIENT
Start: 2023-05-17 | End: 2023-05-17 | Stop reason: SURG

## 2023-05-17 RX ORDER — HYDROCODONE BITARTRATE AND ACETAMINOPHEN 7.5; 325 MG/1; MG/1
1 TABLET ORAL EVERY 4 HOURS PRN
Status: DISCONTINUED | OUTPATIENT
Start: 2023-05-17 | End: 2023-05-17 | Stop reason: HOSPADM

## 2023-05-17 RX ADMIN — SODIUM CHLORIDE, POTASSIUM CHLORIDE, SODIUM LACTATE AND CALCIUM CHLORIDE 9 ML/HR: 600; 310; 30; 20 INJECTION, SOLUTION INTRAVENOUS at 11:57

## 2023-05-17 RX ADMIN — ONDANSETRON 4 MG: 2 INJECTION INTRAMUSCULAR; INTRAVENOUS at 16:24

## 2023-05-17 RX ADMIN — PROPOFOL 20 MG: 10 INJECTION, EMULSION INTRAVENOUS at 14:44

## 2023-05-17 RX ADMIN — PROPOFOL 75 MCG/KG/MIN: 10 INJECTION, EMULSION INTRAVENOUS at 14:45

## 2023-05-17 RX ADMIN — BUPIVACAINE HYDROCHLORIDE 1.2 ML: 7.5 INJECTION, SOLUTION EPIDURAL; RETROBULBAR at 14:38

## 2023-05-17 RX ADMIN — CEFAZOLIN SODIUM 2 G: 2 INJECTION, SOLUTION INTRAVENOUS at 14:45

## 2023-05-17 RX ADMIN — LIDOCAINE HYDROCHLORIDE 40 MG: 20 INJECTION, SOLUTION EPIDURAL; INFILTRATION; INTRACAUDAL; PERINEURAL at 14:44

## 2023-05-17 RX ADMIN — PREGABALIN 75 MG: 75 CAPSULE ORAL at 11:39

## 2023-05-17 RX ADMIN — ONDANSETRON HYDROCHLORIDE 4 MG: 2 SOLUTION INTRAMUSCULAR; INTRAVENOUS at 21:42

## 2023-05-17 RX ADMIN — CEFAZOLIN SODIUM 2 G: 2 INJECTION, SOLUTION INTRAVENOUS at 23:20

## 2023-05-17 RX ADMIN — CEFAZOLIN SODIUM 2 G: 2 INJECTION, SOLUTION INTRAVENOUS at 14:16

## 2023-05-17 RX ADMIN — TRANEXAMIC ACID 1000 MG: 1 INJECTION, SOLUTION INTRAVENOUS at 14:45

## 2023-05-17 RX ADMIN — ROPIVACAINE HYDROCHLORIDE 14 ML: 5 INJECTION, SOLUTION EPIDURAL; INFILTRATION; PERINEURAL at 12:32

## 2023-05-17 RX ADMIN — HYDROCODONE BITARTRATE AND ACETAMINOPHEN 1 TABLET: 5; 325 TABLET ORAL at 21:42

## 2023-05-17 RX ADMIN — SODIUM CHLORIDE 100 ML/HR: 9 INJECTION, SOLUTION INTRAVENOUS at 21:42

## 2023-05-17 RX ADMIN — CELECOXIB 200 MG: 200 CAPSULE ORAL at 11:39

## 2023-05-17 RX ADMIN — SODIUM CHLORIDE, POTASSIUM CHLORIDE, SODIUM LACTATE AND CALCIUM CHLORIDE 9 ML/HR: 600; 310; 30; 20 INJECTION, SOLUTION INTRAVENOUS at 12:35

## 2023-05-17 RX ADMIN — ACETAMINOPHEN 1000 MG: 500 TABLET ORAL at 11:39

## 2023-05-17 RX ADMIN — FENTANYL CITRATE 25 MCG: 50 INJECTION, SOLUTION INTRAMUSCULAR; INTRAVENOUS at 12:30

## 2023-05-17 RX ADMIN — DEXAMETHASONE SODIUM PHOSPHATE 4 MG: 4 INJECTION, SOLUTION INTRAMUSCULAR; INTRAVENOUS at 12:32

## 2023-05-17 NOTE — ANESTHESIA PREPROCEDURE EVALUATION
Anesthesia Evaluation     Patient summary reviewed and Nursing notes reviewed   no history of anesthetic complications:  NPO Solid Status: > 8 hours  NPO Liquid Status: > 2 hours           Airway   Mallampati: II  TM distance: >3 FB  Neck ROM: full  Dental      Pulmonary    (+) pulmonary embolism, asthma,  Cardiovascular     ECG reviewed  PT is on anticoagulation therapy    (+) hypertension, CAD, DVT, hyperlipidemia,     ROS comment: S/p TAVR    Neuro/Psych  GI/Hepatic/Renal/Endo      Musculoskeletal     Abdominal    Substance History      OB/GYN          Other   arthritis,                    Anesthesia Plan    ASA 3     spinal     (Patient has hx of aortic stenosis s/p TAVR, doing well functionally and echo appears mostly normal following procedure. She has stopped her eliquis >72hrs and thus it is safe to proceed with spinal anesthesia. )  intravenous induction     Anesthetic plan, risks, benefits, and alternatives have been provided, discussed and informed consent has been obtained with: patient.        CODE STATUS:

## 2023-05-17 NOTE — OP NOTE
Danny Lorenz MD  Left TOTAL KNEE ARTHROPLASTY  Procedure Note    Micki Vera  5/17/2023    Pre-op Diagnosis: Osteoarthritis left knee primary generalized, severe acquired genu valgum and flexion contracture  Post-op Diagnosis:Same  Procedure: Left total Knee Arthroplasty cpt 30971  Surgical Approach: Knee Medial Parapatellar  Surgeon:  Danny Lorenz MD  Assistant:    Evelio Mayorga NP  The services of a first assist were necessary for performing the procedure safely and expeditiously.  The first assist was present for the entire duration of the case and helped with positioning, retraction and closure of the incision.    Anesthesia: Spinal, Anesthesiologist: Marlo Ferraro MD  CRNA: Lissa Reese CRNA  Staff: Circulator: Christiane Toscano RN  Scrub Person: Del Ledesma  Vendor Representative: Anup Dorado; Wilner Solano (Sky)  Assistant: Evelio Maoyrga APRN CFA  Estimated Blood Loss: minimal  Specimens: * No orders in the log *  Drains: none  Complications: None    Components Utilized:   Implant Name Type Inv. Item Serial No.  Lot No. LRB No. Used Action   CMT BONE PALACOS R HI/VISC 1X40 - [id number] Implant CMT BONE PALACOS R HI/VISC 1X40  [facility] [id number] Left 2 Implanted   DEV CONTRL TISS STRATAFIX PDS PLS OS6 REV SZ1 18IN 45CM - [id number] Implant DEV CONTRL TISS STRATAFIX PDS PLS OS6 REV SZ1 18IN 45CM  [employer]  DIV OF J AND J [id number] Left 1 Implanted   DEV CONTRL TISS STRATAFIX SPIRAL MNCRYL UD 3/0 PLS 30CM - [id number] Implant DEV CONTRL TISS STRATAFIX SPIRAL MNCRYL UD 3/0 PLS 30CM  [employer]  DIV OF J AND J [id number] Left 1 Implanted   COMP FEM/KN GMKSPHERE [facility] COCR SZ4 LT - [id number] Implant COMP FEM/KN GMKSPHERE CMT COCR SZ4 LT  [employer] [id number] Left 1 Implanted   COMP PAT/RESRF GMK SHELLY [facility] SZ2 - [id number] Implant COMP PAT/RESRF GMK SHELLY [facility] SZ2  [employer] [id number] Left 1 Implanted   TRY TIB GMK FIX [facility] [id number] LT -  [id number] Implant TRY TIB GMK FIX CMT [id number] LT  [employer] [facility] [id number] Left 1 Implanted   WAX BONE HEMO SILVANO 2.5G - [id number] Implant WAX BONE HEMO SILVANO 2.5G  [employer]  [employer] [id number] Left 1 Implanted   INSRT TIB/KN GMK/SPHERE FLX SZ4 11MM LT - [id number] Implant INSRT TIB/KN GMK/SPHERE FLX SZ4 11MM LT  [employer] [facility] [id number] Left 1 Implanted         Indication for Procedure:   The patient is a 83 y.o. female presents today for a total knee arthroplasty procedure because of failure to conservatively manage the patient's pain for arthritis.  The patient was educated in risks of surgery that could include possible risk of infection, deep venous thrombosis, pulmonary embolism, fracture, neurovascular injury, leg length discrepancy, dislocation, possible persistent pain, need for additional surgeries, anesthetic risks, medical risks including heart attack and stroke, and death.  The discussion occurred in the office pre-operatively, and patient had the opportunity to ask questions, and concerns about the proposed surgery.  The patient also understood that medicine is not an exact science, and that outcomes of the surgical procedure may be less than desired.  The patient was noted to be high risk of perioperative complications including DVT and pulmonary embolism given her history.  Additionally she has large varicose veins on the anterior aspect of her knee.  After thorough risk and benefit discussion, her impaired mobility had progressed to the point that she decided that she wished to proceed with operative intervention.    Protocols for intravenous antibiotics and venous thrombosis were followed for this patient.  IV antibiotics were infused prior to surgery and will be discontinued within 24 hours of completion of the surgical procedure.  Thrombosis prophylaxis will be initiated within 24 hours of the completion of the surgical procedure.      Procedure:   After the patient was identified in the preoperative  area, and the surgical site confirmed and marked, the patient was brought to the operating room on a stretcher.  They were placed supine on the operating room table and the above anesthetic was placed uneventfully.  A time-out procedure was performed.  The intravenous antibiotics infusion was completed.  A non-sterile tourniquet was placed on the operative thigh, and was prepped and draped in the usual sterile fashion.  An Esmarch was to exsanguinate the limb, and the tourniquet was inflated.  The patient's knee was examined she had some instability at terminal extension she had about a 15 degree flexion contracture and approximately 10 degrees of fixed valgus deformity.   A 10 blade scalpel was used to make a longitudinal incision from above the patella to medial to the tibial tubercle.  A medial parapatellar arthrotomy was performed with another 10 blade scalpel.  The fat pat was preserved and used as a medial retractor.  The medial joint line was elevated subperiosteally with electrocautery and a Reed elevator.   The patella was everted and a lateral denervation was performed as well as a lateral facet ostectomy.  The patella was then everted and the resection was made of approximately 9 mm.  The patella was then positioned with a size 2 trial in a medialized position after lug holes were drilled.  This was performed to assist with patellar tracking.  The knee was then flexed and Borden's line was drawn on the distal femur with electrocautery.  Then the drill was placed into the distal femur into the medullary canal.  The cartilage was removed from the medial femoral condyle.  The intramedullary distal femoral valgus cutting guide set to 5 degrees of femoral valgus was then placed into the medullary canal.  It was then pinned and the oscillating saw was used to make the osteotomy.    Then the extramedullary cutting guide was placed aligned with the tibial eminence superiorly and the tibial shaft distally, and  the cut was aligned for a neutral cut along the mechanical axis.  The oscillating saw was then used to complete the osteotomy cuts.     The knee was then placed in extension with a 10 mm spacer block.  The extension gap was assessed for both symmetry medially and laterally as well as for overall level of resection both at the tibia and femur.  At this point, I was satisfied with the resections of both the tibia and the femur.  I proceeded on towards the femoral sizer.  A posterior referencing femoral sizer was set at 3 degrees of external rotation.  The femur was sized pins were placed and then an all-in-one cutting block was placed.  I confirmed appropriate anterior posterior dimensions using an kisha wing as well as medial lateral dimensions.  All resections were made using the sagittal saw as well as a reciprocating saw for the trochlear cut.  Bone fragments were removed.  Medial and lateral periarticular osteophyte remnants were removed.   A laminar  was then placed medially and then laterally to remove the medial and lateral meniscus and the posterior osteophytes.  At this point the posterior cruciate ligament was intact and preserved.  The posterior medial and posterior lateral capsule was preserved.  A spacer block was placed and the knee at flexion and the knee was balanced in flexion for a medial pivot.  The knee was then placed in extension and confirmed to be balanced as well.  At this point the femoral trial was applied the tibial baseplate was inserted and the tibia was floated to assess rotation.  The knee was examined again was stable throughout range of motion.   The lug holes were drilled in the distal femur.  The tibia was drilled and broached in the typical fashion.  The trial components were then removed and the final implants were confirmed and opened.  The bone surfaces were then irrigated and dried.  Periarticular injection was placed in the posterior capsule Palacos cement was then  mixed and placed on the cut bone surfaces and back side of the implants.  The implants were then impacted into place, and the trial polyethylene spacer was placed and the knee was placed into extension.  A stack of towels was placed under the ankle and the cement was allowed to harden.  Once the cement had cured the knee was again ranged and confirmed to be balanced and have excellent range of motion.  The definitive tibial insert was placed, which was size 11. The tourniquet was then dropped.  Hemostasis was obtained.  The wound was then irrigated with the pulse lavage irrigation system with a 3 liter mixture of 0.35% Betadine and normal saline.  The local mixture was injected throughout the knee for postoperative pain control.   The arthrotomy was then closed..  #2 Ethibond sutures were used as stay sutures to close the arthrotomy followed by #1 Stratafix PDS.  The deep dermis was closed with 2-0 Vicryl.  The skin was closed using 3-0 Monocryl as well as skin glue.. Sterile silver impregnated occlusive dressings were applied.   Sponge and needle counts were completed and were correct.  The patient was awakened from anesthetic and was returned to recovery in stable condition.    The patient will receive routine postoperative antibiotic prophylaxis with Ancef.  The patient will be placed back on her home dose of Eliquis postoperatively in conjunction with SCDs.    Danny Lorenz MD  Orthopaedic Surgeon    [facility]  [phone]          Date: 5/17/2023  Time: 16:19 EDT

## 2023-05-17 NOTE — ANESTHESIA PROCEDURE NOTES
Spinal Block      Patient reassessed immediately prior to procedure    Patient location during procedure: OR  Start Time: 5/17/2023 2:30 PM  Stop Time: 5/17/2023 2:38 PM  Indication:at surgeon's request  Performed By  Anesthesiologist: Marlo Ferraro MD  Preanesthetic Checklist  Completed: patient identified, IV checked, site marked, risks and benefits discussed, surgical consent, monitors and equipment checked, pre-op evaluation and timeout performed  Spinal Block Prep:  Patient Position:sitting  Sterile Tech:cap, gloves, gown, mask and sterile barriers  Prep:Chloraprep  Patient Monitoring:blood pressure monitoring, continuous pulse oximetry and EKG    Spinal Block Procedure  Approach:midline  Guidance:landmark technique  Location:L3-L4  Needle Type:Homero  Needle Gauge:25 G  Paresthesia: no  Fluid Appearance:clear  Medications: bupivacaine PF (MARCAINE) 0.75 % injection - Spinal   1.2 mL - 5/17/2023 2:38:00 PM   Post Assessment  Patient Tolerance:patient tolerated the procedure well with no apparent complications  Complications no

## 2023-05-17 NOTE — ANESTHESIA POSTPROCEDURE EVALUATION
Patient: Micki Vera    Procedure Summary     Date: 05/17/23 Room / Location: [facility] OR [address] [place] [address] [facility]    Anesthesia Start: 1420 Anesthesia Stop: 1640    Procedure: TOTAL KNEE ARTHROPLASTY (Left: Knee) Diagnosis:     Surgeons: Danny Lorenz MD Provider: Marlo Ferraro MD    Anesthesia Type: spinal ASA Status: 3          Anesthesia Type: spinal    Vitals  Vitals Value Taken Time   /77 05/17/23 1800   Temp 36.4 °C (97.5 °F) 05/17/23 1800   Pulse 59 05/17/23 1806   Resp 12 05/17/23 1800   SpO2 100 % 05/17/23 1806   Vitals shown include unvalidated device data.        Post Anesthesia Care and Evaluation    Level of consciousness: awake and alert  Pain management: adequate    Airway patency: patent  Anesthetic complications: No anesthetic complications  PONV Status: none  Cardiovascular status: acceptable  Respiratory status: acceptable  Hydration status: acceptable    Comments: /77   Pulse 60   Temp 36.4 °C (97.5 °F) (Oral)   Resp 12   SpO2 100%

## 2023-05-17 NOTE — ANESTHESIA PROCEDURE NOTES
Peripheral Block    Pre-sedation assessment completed: 5/17/2023 12:31 PM    Patient reassessed immediately prior to procedure    Patient location during procedure: pre-op  Start time: 5/17/2023 12:32 PM  Stop time: 5/17/2023 12:34 PM  Reason for block: at surgeon's request and post-op pain management  Performed by  Anesthesiologist: Marlo Ferraro MD  Preanesthetic Checklist  Completed: patient identified, IV checked, site marked, risks and benefits discussed, surgical consent, monitors and equipment checked, pre-op evaluation and timeout performed  Prep:  Pt Position: supine  Sterile barriers:mask, gloves and cap  Prep: ChloraPrep  Patient monitoring: blood pressure monitoring, continuous pulse oximetry and EKG  Procedure    Sedation: yes  Performed under: local infiltration  Guidance:ultrasound guided    ULTRASOUND INTERPRETATION.  Using ultrasound guidance a 21 G gauge needle was placed in close proximity to the nerve, at which point, under ultrasound guidance anesthetic was injected in the area of the nerve and spread of the anesthesia was seen on ultrasound in close proximity thereto.  There were no abnormalities seen on ultrasound; a digital image was taken; and the patient tolerated the procedure with no complications. Images:still images obtained, printed/placed on chart    Block Type:adductor canal block  Injection Technique:single-shot  Needle Type:echogenic and Tuohy  Needle Gauge:21 G  Resistance on Injection: none    Medications Used: dexamethasone (DECADRON) injection - Injection   4 mg - 5/17/2023 12:32:00 PM  ropivacaine (NAROPIN) 0.5 % injection - Injection   14 mL - 5/17/2023 12:32:00 PM      Post Assessment  Injection Assessment: negative aspiration for heme, no paresthesia on injection and incremental injection  Patient Tolerance:comfortable throughout block  Complications:no  Additional Notes  Ultrasound guidance used to visualize nerve anatomy, guide needle placement and verify local  anesthetic disbursement.

## 2023-05-17 NOTE — H&P
"        ORTHOPEDIC SURGERY PRE-OP HISTORY AND PHYSICAL      Patient: Micki Vera  Date of Admission: 5/17/2023 10:46 AM  YOB: 1940  Medical Record Number: [id number]  Attending Physician: Danny Lorenz MD  Consulting Physician: Danny Lorenz MD    CHIEF COMPLAINT: Left Knee Pain.    HISTORY OF PRESENT ILLNESS: Patient is a 83 y.o. female presents to [facility] with above complaints.  The patient failed conservative treatment and patient requested surgical intervention.  The patient presents for a  Left total knee.  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  This has progressed to the point that she has had several falls because of instability in her knee.  Additionally, she has risk factors including history of pulmonary embolism.  She is chronically anticoagulated.  She has been off of her Eliquis for 3 days now    ALLERGIES:   Allergies   Allergen Reactions   • Hydrochlorothiazide W-Triamterene Hives     \"Rash\"   • Statins Other (See Comments)     myalgias   • Vancomycin Rash     Can take PO   Cannot take it as IV   • Aminophylline Hives   • Clindamycin Diarrhea   • Erythromycin Hives   • Sulfa Antibiotics Hives and Rash   • Theophylline Hives       HOME MEDICATIONS:  Medications Prior to Admission   Medication Sig Dispense Refill Last Dose   • acetaminophen (TYLENOL) 650 MG 8 hr tablet Take 1 tablet by mouth Every 8 (Eight) Hours As Needed for Mild Pain.   5/17/2023   • Chlorhexidine Gluconate Cloth 2 % pads Apply  topically Take As Directed. PRIOR TO SURGERY   5/17/2023   • clonazePAM (KlonoPIN) 1 MG tablet TAKE 0.5 TABLETS BY MOUTH NIGHTLY AS NEEDED FOR ANXIETY (OR SLEEP). MAX DAILY AMOUNT: 0.5 MG   5/16/2023   • dry mouth gel (BIOTENE ORALBALANCE) gel Apply  to the mouth or throat As Needed for Dry Mouth.   5/16/2023   • Fluticasone Furoate-Vilanterol 100-25 MCG/ACT aerosol powder  Inhale 1 puff Daily.   5/17/2023   • loratadine (CLARITIN) 10 MG " tablet Take 1 tablet by mouth Daily.   5/16/2023   • nebivolol (BYSTOLIC) 10 MG tablet Take 1 tablet by mouth Daily.   5/17/2023 at 0730   • polyethyl glycol-propyl glycol (SYSTANE) 0.4-0.3 % solution ophthalmic solution (artificial tears) Administer 1 drop to both eyes 3 (Three) Times a Day.   5/16/2023   • amoxicillin (AMOXIL) 875 MG tablet TAKE 1 TABLET BY MOUTH EVERY 12 HOURS UNTIL GONE      • B Complex Vitamins (VITAMIN B COMPLEX PO) Take  by mouth.   5/10/2023   • Cholecalciferol 25 MCG (1000 UT) capsule Take 2 capsules by mouth Daily.   5/10/2023   • Eliquis 2.5 MG tablet tablet Take 1 tablet by mouth 2 (Two) Times a Day. HOLD 2 DAYS PRIOR SURGERY PER MD INSTRUCTIONS   5/14/2023   • Homeopathic Products (AZO Yeast Plus) tablet Take  by mouth Take As Directed.      • Misc Natural Products (Magic Mushroom Mix) capsule Take 2 tablets by mouth Daily. HOLD 14 DAYS PRIOR TO SURGERY   5/3/2023   • multivitamin with minerals tablet tablet Take 1 tablet by mouth Daily. HOLD PRIOR TO SURGERY 7 DAYS   5/10/2023   • nitroglycerin (NITROSTAT) 0.4 MG SL tablet Place 1 tablet under the tongue.   More than a month   • Unable to find 1 each Take As Directed. HOMEOPATHIC SOOTHING NERVE FOAM FOR NEUROPATHIC FOOT PAIN          Past Medical History:   Diagnosis Date   • Anesthesia complication 2018    BREATHING DIFFICULTIES S/P BREAST SURGERY-TROUBLE WEANING OFF VENT   • Aortic valve disorder    • Asthma    • Breast cancer     RADIATION RIGHT; RADIATION AND CHEMO LEFT(HR2-)   • Coronary artery disease    • Female bladder prolapse     URINE LEAKAGE   • History of COVID-19 02/02/2023   • Hyperlipidemia    • Hypertension    • Jaw pain     RIGHT-FOLLOWED BY PCP   • Osteoarthritis    • Peripheral neuropathy    • Poisoning by stingray 2005    RIGHT SECOND TOE   • Pulmonary emboli 2009   • Rotator cuff injury     RIGHT   • Stye     FOLLOWED BY STEFFANY   • Thyroid nodule    • Varicose vein of leg      Past Surgical History:    Procedure Laterality Date   • AORTIC VALVE REPAIR/REPLACEMENT     • BREAST SURGERY Bilateral    • CARDIAC CATHETERIZATION      STENTS X 2   • EYE SURGERY Bilateral     CATARACT EXT. WITH LENS IMPLANT   • FOOT SURGERY Right 2005    SECOND TOE   • ILIAC ARTERY STENT     • MICROAMBULATORY PHLEBECTOMY Left    • TONSILLECTOMY       Social History     Occupational History   • Not on file   Tobacco Use   • Smoking status: Former     Years: 5.00     Types: Cigarettes     Quit date:      Years since quittin.4   • Smokeless tobacco: Never   Vaping Use   • Vaping Use: Never used   Substance and Sexual Activity   • Alcohol use: Not Currently   • Drug use: Not on file   • Sexual activity: Not on file      Social History     Social History Narrative   • Not on file     Family History   Problem Relation Age of Onset   • Malig Hyperthermia Neg Hx        REVIEW OF SYSTEMS:    HEENT: Patient denies any headaches, vision changes, change in hearing, or tinnitus, Patient denies epistaxis, sinus pain, hoarseness, or dysphagia   Pulmonary: Patient denies any cough, congestion, acute change in SOA or wheezing.   Cardiovascular: Patient denies any change in chest pain, dyspnea, palpitations, weakness, intolerance of exercise, varicosities, change in murmur   Gastrointestinal:  Patient denies change in appetite, melena, change in bowel habits.   Genital/Urinary: Patient denies dysuria, change in color of urine, change in frequency of urination, pain with urgency, change in incontinence, retention.   Musculoskeletal: Patient denies complaints of acute changes in symptoms of other joints not mentioned above.   Neurological: Patient denies changes in dizziness, tremor, ataxia, or difficulty in speaking or changes in memory.   Endocrine system: Patient denies acute changes in tremors, palpitations, polyuria, polydipsia, polyphagia, diaphoresis, exophthalmos, or goiter.   Psychological: Patient denies thoughts/plans of harming self  or other; denies acute changes in depression,  insomnia, night terrors, emy, disorientation.   Skin: Patient denies any bruising, rashes, discoloration, pruritus,or wounds not mentioned in history of present illness or chief complaint above.   Hematopoietic: Patient denies current bleeding, epistaxis, hematuria, or melena.    PHYSICAL EXAM:   Vitals:  Vitals:    05/17/23 1111   BP: 144/76   Patient Position: Sitting   Pulse: 62   Resp: 18   Temp: 98.3 °F (36.8 °C)   TempSrc: Oral   SpO2: 100%       General:  83 y.o. female who appears about stated age.    Alert, cooperative, in no acute distress                       Head:    Normocephalic, without obvious abnormality, atraumatic   Eyes:            Lids and lashes normal, conjunctivae and sclerae normal, no         icterus, no pallor, corneas clear, PERRLA   Ears:    Ears appear intact with no abnormalities noted   Throat:   No oral lesions, no thrush, oral mucosa moist   Neck:   No adenopathy, supple, trachea midline, no JVD   Back:     Limited exam shows no severe kyphosis present,no visible           erythema, no excessive  tenderness to palpation.    Lungs:     Respirations regular, even and unlabored.     Heart:    Normal rate, Pulses palpable   Chest Wall:    No abnormalities observed.   Abdomen:     Normal bowel sounds, no masses, no organomegaly, soft              non-tender, non-distended, no guarding, no rebound                      tenderness   Rectal:     Deferred   Pulses:   Pulses palpable and equal bilaterally   Skin:   No bleeding, bruising or rash   Lymph nodes:   No palpable adenopathy   Extremities:     Left Knee: Skin intact.  Painful ROM.  NVI distally.  There is a mild effusion genu valgum of approximately 10 degrees there is some instability with valgus stress at early flexion as well as terminal extension.  A large varicose vein is seen at the anterior medial aspect of the knee.  No skin breakdown is seen.  No warmth erythema    DIAGNOSTIC  TEST:  No visits with results within 2 Day(s) from this visit.   Latest known visit with results is:   Pre-Admission Testing on 04/27/2023   Component Date Value Ref Range Status   • QT Interval 04/27/2023 398  ms Final   • Hemoglobin A1C 04/27/2023 5.80 (H)  4.80 - 5.60 % Final   • Protime 04/27/2023 15.2 (H)  11.7 - 14.2 Seconds Final   • INR 04/27/2023 1.18 (H)  0.90 - 1.10 Final   • WBC 04/27/2023 7.38  3.40 - 10.80 10*3/mm3 Final   • RBC 04/27/2023 4.25  3.77 - 5.28 10*6/mm3 Final   • Hemoglobin 04/27/2023 12.5  12.0 - 15.9 g/dL Final   • Hematocrit 04/27/2023 38.4  34.0 - 46.6 % Final   • MCV 04/27/2023 90.4  79.0 - 97.0 fL Final   • MCH 04/27/2023 29.4  26.6 - 33.0 pg Final   • MCHC 04/27/2023 32.6  31.5 - 35.7 g/dL Final   • RDW 04/27/2023 13.3  12.3 - 15.4 % Final   • RDW-SD 04/27/2023 44.0  37.0 - 54.0 fl Final   • MPV 04/27/2023 10.5  6.0 - 12.0 fL Final   • Platelets 04/27/2023 187  140 - 450 10*3/mm3 Final   • Glucose 04/27/2023 108 (H)  65 - 99 mg/dL Final   • BUN 04/27/2023 16  8 - 23 mg/dL Final   • Creatinine 04/27/2023 0.80  0.57 - 1.00 mg/dL Final   • Sodium 04/27/2023 136  136 - 145 mmol/L Final   • Potassium 04/27/2023 4.3  3.5 - 5.2 mmol/L Final   • Chloride 04/27/2023 100  98 - 107 mmol/L Final   • CO2 04/27/2023 23.7  22.0 - 29.0 mmol/L Final   • Calcium 04/27/2023 9.8  8.6 - 10.5 mg/dL Final   • Total Protein 04/27/2023 7.0  6.0 - 8.5 g/dL Final   • Albumin 04/27/2023 4.1  3.5 - 5.2 g/dL Final   • ALT (SGPT) 04/27/2023 22  1 - 33 U/L Final   • AST (SGOT) 04/27/2023 26  1 - 32 U/L Final   • Alkaline Phosphatase 04/27/2023 91  39 - 117 U/L Final   • Total Bilirubin 04/27/2023 0.2  0.0 - 1.2 mg/dL Final   • Globulin 04/27/2023 2.9  gm/dL Final   • A/G Ratio 04/27/2023 1.4  g/dL Final   • BUN/Creatinine Ratio 04/27/2023 20.0  7.0 - 25.0 Final   • Anion Gap 04/27/2023 12.3  5.0 - 15.0 mmol/L Final   • eGFR 04/27/2023 72.3  >60.0 mL/min/1.73 Final       No results found.      ASSESSMENT:  Left  Knee Osteoarthritis    * No active hospital problems. *      PLAN:    Left TKA.    Risks and benefits of surgical intervention were discussed in detail with the patient.  Risks of infection, fracture, dislocation, extremity length discrepancy, neurovascular injury, persistent pain, medical risks, anesthetic risk, need for additional surgery, deep venous thrombosis, pulmonary embolism and death.  In this particular patient, she is well apprised of the risks specifically related to VTE given her history.  Risks and benefits were again discussed at length.  Additionally, she is aware that she may require revision components due to the instability of her knee.  This will be assessed intraoperatively.    The above diagnosis and treatment plan was discussed with the patient and/or family.  They were educated in both non-surgical and surgical treatment options for their condition.   They were given the opportunity to ask questions and were answered to their satisfaction.  They agreed to proceed with the above treatment plan.        Danny Lorenz MD  Date: 5/17/2023

## 2023-05-18 VITALS
SYSTOLIC BLOOD PRESSURE: 138 MMHG | HEART RATE: 65 BPM | OXYGEN SATURATION: 99 % | TEMPERATURE: 97.2 F | DIASTOLIC BLOOD PRESSURE: 70 MMHG | RESPIRATION RATE: 16 BRPM

## 2023-05-18 LAB
HCT VFR BLD AUTO: 35.1 % (ref 34–46.6)
HGB BLD-MCNC: 11.5 G/DL (ref 12–15.9)

## 2023-05-18 PROCEDURE — 63710000001 NEBIVOLOL 10 MG TABLET: Performed by: ORTHOPAEDIC SURGERY

## 2023-05-18 PROCEDURE — 97161 PT EVAL LOW COMPLEX 20 MIN: CPT

## 2023-05-18 PROCEDURE — A9270 NON-COVERED ITEM OR SERVICE: HCPCS | Performed by: ORTHOPAEDIC SURGERY

## 2023-05-18 PROCEDURE — 63710000001 CHOLECALCIFEROL 25 MCG (1000 UT) TABLET: Performed by: ORTHOPAEDIC SURGERY

## 2023-05-18 PROCEDURE — 63710000001 APIXABAN 2.5 MG TABLET: Performed by: ORTHOPAEDIC SURGERY

## 2023-05-18 PROCEDURE — 85014 HEMATOCRIT: CPT | Performed by: ORTHOPAEDIC SURGERY

## 2023-05-18 PROCEDURE — 63710000001 CLONAZEPAM 0.5 MG TABLET: Performed by: ORTHOPAEDIC SURGERY

## 2023-05-18 PROCEDURE — 63710000001 HYDROCODONE-ACETAMINOPHEN 5-325 MG TABLET: Performed by: ORTHOPAEDIC SURGERY

## 2023-05-18 PROCEDURE — G0378 HOSPITAL OBSERVATION PER HR: HCPCS

## 2023-05-18 PROCEDURE — 25010000002 CEFAZOLIN IN DEXTROSE 2-4 GM/100ML-% SOLUTION: Performed by: ORTHOPAEDIC SURGERY

## 2023-05-18 PROCEDURE — 63710000001 CETIRIZINE 10 MG TABLET: Performed by: ORTHOPAEDIC SURGERY

## 2023-05-18 PROCEDURE — 97110 THERAPEUTIC EXERCISES: CPT

## 2023-05-18 PROCEDURE — 85018 HEMOGLOBIN: CPT | Performed by: ORTHOPAEDIC SURGERY

## 2023-05-18 RX ORDER — TRAMADOL HYDROCHLORIDE 50 MG/1
50 TABLET ORAL EVERY 8 HOURS PRN
Qty: 21 TABLET | Refills: 0 | Status: SHIPPED | OUTPATIENT
Start: 2023-05-18 | End: 2023-05-25

## 2023-05-18 RX ORDER — HYDROCODONE BITARTRATE AND ACETAMINOPHEN 5; 325 MG/1; MG/1
1 TABLET ORAL EVERY 4 HOURS PRN
Qty: 40 TABLET | Refills: 0 | Status: SHIPPED | OUTPATIENT
Start: 2023-05-18 | End: 2023-05-25

## 2023-05-18 RX ADMIN — CETIRIZINE HYDROCHLORIDE 10 MG: 10 TABLET ORAL at 08:56

## 2023-05-18 RX ADMIN — APIXABAN 2.5 MG: 2.5 TABLET, FILM COATED ORAL at 08:56

## 2023-05-18 RX ADMIN — CLONAZEPAM 0.5 MG: 0.5 TABLET ORAL at 00:56

## 2023-05-18 RX ADMIN — NEBIVOLOL 10 MG: 10 TABLET ORAL at 08:56

## 2023-05-18 RX ADMIN — Medication 2000 UNITS: at 08:56

## 2023-05-18 RX ADMIN — HYDROCODONE BITARTRATE AND ACETAMINOPHEN 1 TABLET: 5; 325 TABLET ORAL at 05:39

## 2023-05-18 RX ADMIN — CEFAZOLIN SODIUM 2 G: 2 INJECTION, SOLUTION INTRAVENOUS at 06:08

## 2023-05-18 NOTE — PLAN OF CARE
Goal Outcome Evaluation:    Patient is a pleasant 83 y.o. female POD1 L TKA with expected post op weakness and impaired functional mobility. Patient is independent at baseline and lives at home with spouse. Today, patient performed bed mobility with SV, required SBA-CGA for transfers and ambulated 80' SBA with a RW. Stair training completed with no issues. Based on current clinical presentation, patient okay to DC home today with assist and  PT to follow up.

## 2023-05-18 NOTE — DISCHARGE SUMMARY
Discharge Summary    Date of Admission: 5/17/2023 10:46 AM  Date of Discharge:  5/18/2023    Discharge Diagnosis:   Total knee replacement status, left [Z96.652]      PMHX:   Past Medical History:   Diagnosis Date   • Anesthesia complication 2018    BREATHING DIFFICULTIES S/P BREAST SURGERY-TROUBLE WEANING OFF VENT   • Aortic valve disorder    • Asthma    • Breast cancer     RADIATION RIGHT; RADIATION AND CHEMO LEFT(HR2-)   • Coronary artery disease    • Female bladder prolapse     URINE LEAKAGE   • History of COVID-19 02/02/2023   • Hyperlipidemia    • Hypertension    • Jaw pain     RIGHT-FOLLOWED BY PCP   • Osteoarthritis    • Peripheral neuropathy    • Poisoning by stingray 2005    RIGHT SECOND TOE   • Pulmonary emboli 2009   • Rotator cuff injury     RIGHT   • Stye     FOLLOWED BY STEFFANY   • Thyroid nodule    • Varicose vein of leg        Discharge Disposition  [place]Health [place] [facility] [place]    Procedures Performed  Procedure(s):  TOTAL KNEE ARTHROPLASTY       Indication for Admission  Patient is a 83 y.o. female admitted after undergoing the above surgical procedure. They were admitted for post-operative pain control, medical management and physical therapy.  They progressed with physical therapy.    They were deemed stable for discharge.      Consults:   Consults     No orders found for last 30 day(s).          Discharge Instructions:  Patient is weight bearing as tolerated on the operative leg.  Patient is to progress range of motion and ambulation as tolerated.  Use walker as needed for stability and gait.  May progress to cane as tolerated.  The dressing should be left on knee until post-operative day 7, and then removed.  Dressing is waterproof. Patient may shower 3 days after the operation with the dressing in place. Replace the dressing if it becomes wet or saturated. Use compression stockings for leg swelling.  Patient will follow-up in the office in 3 weeks.  Call the office at [phone] for  any questions or concerns.      Discharge Medications     Discharge Medications      New Medications      Instructions Start Date   HYDROcodone-acetaminophen 5-325 MG per tablet  Commonly known as: NORCO   1 tablet, Oral, Every 4 Hours PRN      traMADol 50 MG tablet  Commonly known as: ULTRAM   50 mg, Oral, Every 8 Hours PRN         Continue These Medications      Instructions Start Date   amoxicillin 875 MG tablet  Commonly known as: AMOXIL   TAKE 1 TABLET BY MOUTH EVERY 12 HOURS UNTIL GONE      AZO Yeast Plus tablet   Oral, Take As Directed      Cholecalciferol 25 MCG (1000 UT) capsule   2,000 Units, Oral, Daily      clonazePAM 1 MG tablet  Commonly known as: KlonoPIN   TAKE 0.5 TABLETS BY MOUTH NIGHTLY AS NEEDED FOR ANXIETY (OR SLEEP). MAX DAILY AMOUNT: 0.5 MG      dry mouth gel gel   Mouth/Throat, As Needed      Eliquis 2.5 MG tablet tablet  Generic drug: apixaban   2.5 mg, Oral, 2 Times Daily, HOLD 2 DAYS PRIOR SURGERY PER MD INSTRUCTIONS      Fluticasone Furoate-Vilanterol 100-25 MCG/ACT aerosol powder    1 puff, Inhalation, Daily - RT      loratadine 10 MG tablet  Commonly known as: CLARITIN   10 mg, Oral, Daily      Magic Mushroom Mix capsule   2 tablets, Oral, Daily, HOLD 14 DAYS PRIOR TO SURGERY      multivitamin with minerals tablet tablet   1 tablet, Oral, Daily, HOLD PRIOR TO SURGERY 7 DAYS      nebivolol 10 MG tablet  Commonly known as: BYSTOLIC   10 mg, Oral, Daily      nitroglycerin 0.4 MG SL tablet  Commonly known as: NITROSTAT   0.4 mg, Sublingual      polyethyl glycol-propyl glycol 0.4-0.3 % solution ophthalmic solution (artificial tears)  Commonly known as: SYSTANE   1 drop, Both Eyes, 3 Times Daily      Unable to find   1 each, Take As Directed, HOMEOPATHIC SOOTHING NERVE FOAM FOR NEUROPATHIC FOOT PAIN      VITAMIN B COMPLEX PO   Oral         Stop These Medications    acetaminophen 650 MG 8 hr tablet  Commonly known as: TYLENOL     Chlorhexidine Gluconate Cloth 2 % pads            Discharge  Diet: Regular diet    Activity at Discharge: Weight bearing as tolerated, activity as tolerated    Follow-up Appointments  No future appointments.           05/18/23,  07:44 EDT    Danny Lorenz MD  Orthopaedic Surgeon    [facility]  [phone]

## 2023-05-18 NOTE — PROGRESS NOTES
Procedure(s):  TOTAL KNEE ARTHROPLASTY     LOS: 0 days     Subjective :   Patient seen and examined.  Resting comfortably.  Some pain, predominantly in the posterior knee and calf - controlled with medication.  Alert, responding appropriately to questions.  Reports one loose stool, otherwise, denies any new problems overnight.      Objective :    Vital signs in last 24 hours:  Vitals:    05/17/23 1832 05/17/23 2248 05/18/23 0138 05/18/23 0530   BP: 142/76 104/60 115/68 138/70   BP Location: Left arm Left arm Left arm Left arm   Patient Position: Lying Lying Lying Lying   Pulse: 61 62 64 65   Resp: 16 16 16 16   Temp: 95.9 °F (35.5 °C) 97.5 °F (36.4 °C) 97.4 °F (36.3 °C) 97.2 °F (36.2 °C)   TempSrc: Axillary Oral Oral Oral   SpO2: 95% 91% 98% 99%       PHYSICAL EXAM:  Patient is calm, in no acute distress, awake and oriented x 3.  Dressing is clean, dry and intact.  No signs of infection.  Swelling is appropriate in amount.  Ecchymosis is appropriate in amount.  EHL, FHL, TA, GS intact.  Patient is neurovascularly intact distally.    LABS:  Results from last 7 days   Lab Units 05/18/23  0538   HEMOGLOBIN g/dL 11.5*   HEMATOCRIT % 35.1             Narrative & Impression   XR KNEE 1 OR 2 VW LEFT-     INDICATIONS: Postoperative evaluation.     TECHNIQUE: Frontal and lateral views of the left knee     COMPARISON: 04/27/2023     FINDINGS:      Intact appearing knee arthroplasty hardware is seen with adjacent  surgical soft tissue gas. No acute fracture is identified. Arterial  calcification is present.        IMPRESSION:     Postsurgical changes.           This report was finalized on 5/17/2023 5:17 PM by Dr. Rm España M.D.            ASSESSMENT:  Status post Procedure(s):  TOTAL KNEE ARTHROPLASTY      Plan:  Continue Physical Therapy, increase mobility and range of motion as tolerated.  WBAT to the operative extremity    Formal PT eval pending    Continue SCDs & DVT prophylaxis  Resume home dose Eliquis  -  2.5mg PO BID for VTE chemoprophylaxis    Dispo planning for home with home health and family assistance, likely today if doing well with PT.     Evelio Mayorga, APRN    Date: 5/18/2023  Time: 06:27 EDT

## 2023-05-18 NOTE — DISCHARGE INSTRUCTIONS
Dr. Danny Lorenz   Total Knee Replacement Discharge Instructions:  Office Phone Number: [phone]    I. ACTIVITIES:  1. Exercises:  Complete exercise program as taught by the hospital physical therapist 2 times per day.  You may wean off the walker to a cane when directed by the physical therapist.  Exercise program will be advanced by the physical therapist  During the day be up ambulating every 2 hours (while awake) for short distances  Complete the ankle pump exercises at least 10 times per hour (while awake)  Elevate legs most of the day the first week post operatively and thereafter elevate legs when in bed and for at least 30 minutes during the day. Use cold packs 20-30 minutes approximately 5 times per day. This should be done before and after completing your exercises and at any time you are experiencing pain/ stiffness in your operative extremity.      2. Activities of Daily Living:  No tub baths, hot tubs, or swimming pools for 4 weeks  The tan or skin colored dressing is on your knee is waterproof.  You may shower without covering the dressing beginning 3 days after the operation.  After 7 days you may remove the dressing.  If the dressing becomes saturated prior to day 7, it may be changed.  After dressing removal, do not scrub or rub the incision. Allow skin glue to fall off over the next few weeks.  After the dressing is removed, simply let the water run over the incision and pat dry.    II. Restrictions  Follow any movement restrictions that was discussed with you by either Dr. Lorenz or the physical therapist.     Avoid kneeling on the knee that was operated on  Dr. Lorenz will discuss with you when you will be able to drive again at your first post-op appointment.  Weight bearing is as tolerated.  First week stay inside on even terrain. May go up and down stairs one stair at a time utilizing the hand rail.  Once you feel confident, you may venture outside.    Exercises:  Perform quad sets  as instructed by the therapist at least 3 times a day  Perform leg hangs with you heel placed on a chair or footrest and allow you knee to stretch towards a more straightened position several times a day  Work on knee flexion exercises at least three times daily.  Avoid placing a pillow under your knee as this will cause your knee to become more stiff throughout the recovery process.    III. Precautions:  Everyone that comes near you should wash their hands  No elective dental, genital-urinary, or colon procedures or surgical procedures for 12 weeks after surgery unless absolutely necessary.   If dental work or surgical procedure is deemed absolutely necessary within 12 weeks of surgery, you will need to contact Dr. Lorenz's office as you will need to take antibiotics 1 hour prior to any dental work (including teeth cleanings).  Dr. Lorenz will prescribe prophylactic antibiotics for all dental procedures for one year  as a precautionary measure to minimize risk of infection.  If you are a diabetic or take immunosuppressive medication, you may have to take prophylactic antibiotics the remainder of your life before dental work.    Avoid sick people. If you must be around someone who is ill, they should wear a mask.  Avoid visits to the Emergency Room or Urgent Care unless you are having a life threatening event.   If you have leg swelling you may wear leg compression stocking.   Stockings are to be placed on in the morning and removed at night. Monitor the stockings to ensure that any swelling is not causing the stockings to become too tight. In this case, remove stockings immediately.    IV. INCISION CARE:  Dr. Lorenz takes great care in closing your incision to give you the best opportunity for a healthy incision with minimal scarring. He places sutures below the skin surface that will eventually dissolve.  The incision is then covered with a skin glue which makes the incision water tight, and minimizes bleeding  onto the dressing.  No staples are used.  Occasionally one of the buried stitches may come to the skin surface and may need to be removed.  Please resist the temptation of removing the stitch by yourself.   will be happy to remove it for you.  Bruising around the incision and thigh is normal and to be expected.  Please keep dressing in place at least until post-op day 7. You may remove and replace dressing before day 7 if the dressing begins to fall off or becomes saturated. Wash your hands and under your finger nails prior to dressing changes.  After day 7 as long as incision is dry and intact, you may leave the dressing off and open to air.    If dressing must be changed, utilize dry gauze and paper tape. Avoid touching the side of the gauze that goes against the incision with your hands.  No creams or ointments to the incision until permission given by Dr. Lorenz.  Do not touch or pick at the incision, or try to remove any sutures or skin glue.  Check dressing every day and notify surgeon immediately if any of the following signs or symptoms are noted:  Increase in redness  Increase in swelling of the entire extremity that does not go away with elevation.  Notify office that you may have a blood clot.    Drainage oozing from the incision  Pulling apart of the edges of the incision  Increase in overall body temperature (greater than 100.5 degrees)    V. Medications:   1. Anticoagulants: You will be discharged on an anticoagulant. This is a prophylactic medication that helps prevent blood clots during your post-operative period. The type and length of dosage varies based on your individual needs, procedure performed, and Dr. Lorenz's preference.  While taking the anticoagulant, you should avoid taking any additional aspirin than what is prescribed.   Notify surgeon immediately if any thelma bleeding is noted in the urine, stool, emesis, or from the nose or the incision. Blood in the stool will often appear  as black rather than red. Blood in urine may appear as pink. Blood in emesis may appear as brown/black like coffee grounds.  You will need to apply pressure for longer periods of time to any cuts or abrasions to stop bleeding  Avoid alcohol while taking anticoagulants.    2. Stool Softeners: You will be at greater risk of constipation after surgery due to being less mobile and the pain medications.   Take stool softeners as instructed by your surgeon while on pain medications. Over the counter Colace 100 mg 1-2 capsules twice daily.   If stools become too loose or too frequent, please decreases the dosage or stop the stool softener.  If constipation occurs despite use of stool softeners, you are to continue the stool softeners and add a laxative (Milk of Magnesia 1 ounce daily as needed).  If no bowel movement occurs past 3 days, then purchase Magnesium citrate and drink 1/2 bottle every 8 hours (on ice tastes better) until success. If no bowel movement by post-operative day 5 please call Dr. Lorenz office for further instructions.   You may need to decrease or stop your pain medications if bowel movements to not occur.     Drink plenty of fluids, and eat fruits and vegetables during your recovery time.    3. Pain Medications utilized after surgery are narcotics and the law requires that the following information be given to all patients that are prescribed narcotics:  CLASSIFICATION: Pain medications are called Opioids and are narcotics  LEGALITIES: It is illegal to share narcotics with others and to drive within 24 hours of taking narcotics  POTENTIAL SIDE EFFECTS: Potential side effects of opioids include: nausea, vomiting, itching, dizziness, drowsiness, dry mouth, constipation, and difficulty urinating.  POTENTIAL ADVERSE EFFECTS:   Opioid tolerance can develop with use of pain medications and this simply means that it requires more and more of the medication to control pain; however, this is seen more in  "patients that use opioids for longer periods of time.  Opioid dependence can develop with use of Opioids and this simply means that to stop the medication can cause withdrawal symptoms; however, this is seen with patients that use Opioids for longer periods of time.  Opioid addiction can develop with use of Opioids and the incidence of this is very unlikely in patients who take the medications as ordered and stop the medications as instructed.  Opioid overdose can be dangerous, but is unlikely when the medication is taken as ordered and stopped when ordered. It is important not to mix opioids with alcohol or with and type of sedative such as Benadryl as this can lead to over sedation and respiratory difficulty.  DOSAGE:   Pain medications will need to be taken consistently for the first few days to decrease pain and promote adequate pain relief and participation in physical therapy.  After the initial surgical pain begins to resolve, you may begin to decrease the pain medication. By the end of 6 weeks, you should be off of pain medications except for before physical therapy or to help with pain when attempting to fall asleep.  Pain medications will be tapered to lesser dosages as you are further from your surgical day.  No pain medications will be provided after 3 months from surgery.     Refills will not be given by the office during evening hours, or weekends.  To seek refills on pain medications during the post-operative period, you must call the office 48 hours in advance to request the refill. The office will then notify you when to  the prescription. DO NOT wait until you are out of the medication to request a refill.  They can not be \"called in\" to the pharmacy.      How to Wean Off Pain Medication:   As you begin to feel better, gradually wean off the narcotic pain medication and begin to use it only for breakthrough pain.  Gradually reduce the total number of pills you take each day.  This can be " done by taking fewer pills at a time or by increasing the amount of time between each pill.    For example, if you were taking 2 pills every 6 hours you would be taking a total of 8 pills per day.  Reduce this to 6 pills per day, then 4-5 and so on.  This can be done by taking 1 pill at a time instead of 2, or by taking the pills every 8 hours instead of every 6.    As you begin to wean from the narcotic pain medication, begin substituting with over the counter tylenol when you are not taking the narcotic.  Limit total tylenol dosage to less than 4 grams per day.    V. FOLLOW-UP VISITS:  You will need to follow up in the office with Dr. Lorenz at 3 weeks.  Please call [phone] if you need to confirm or reschedule your appointment time.   If you have any concerns or suspected complications prior to your follow up visit, please call your surgeons office. Do not wait until your appointment time if you suspect complications. These will need to be addressed in the office promptly.

## 2023-05-18 NOTE — CASE MANAGEMENT/SOCIAL WORK
Case Management Discharge Note      Final Note: Patient discharged home with [facility]. Spouse to transport. aSndie BETHEA LCSW         Selected Continued Care - Discharged on 5/18/2023 Admission date: 5/17/2023 - Discharge disposition: Home-Health Care c    Destination    No services have been selected for the patient.              Durable Medical Equipment    No services have been selected for the patient.              Dialysis/Infusion    No services have been selected for the patient.              Home Medical Care Coordination complete.    Service Provider Selected Services Address Phone Fax Patient Preferred    [facility] [address] [place] [id number] [phone] --          Therapy    No services have been selected for the patient.              Community Resources    No services have been selected for the patient.              Community & DME    No services have been selected for the patient.                  Transportation Services  Private: Car    Final Discharge Disposition Code: 06 - home with home health care

## 2023-05-18 NOTE — DISCHARGE PLACEMENT REQUEST
"Kofi Vera (83 y.o. Female)     Date of Birth   1940    Social Security Number       Address   [address]  [place] [id number]    Home Phone   [phone]    MRN   [id number]       Shinto   Yarsani    Marital Status                               Admission Date   5/17/23    Admission Type   Elective    Admitting Provider   Danny Lorenz MD    Attending Provider   Danny Lorenz MD    Department, Room/Bed   [address], P884/1       Discharge Date       Discharge Disposition   Home-Health Care [facility]    Discharge Destination                               Attending Provider: Danny Lorenz MD    Allergies: Hydrochlorothiazide W-triamterene, Statins, Vancomycin, Aminophylline, Clindamycin, Erythromycin, Sulfa Antibiotics, Theophylline    Isolation: None   Infection: None   Code Status: Not on file    Ht: 167.6 cm (66\")   Wt: 65.1 kg (143 lb 8 oz)    Admission Cmt: None   Principal Problem: Total knee replacement status, left [Z96.652]                 Active Insurance as of 5/17/2023     Primary Coverage     Payor Plan Insurance Group Employer/Plan Group    ANTHEM BLUE CROSS ANTHEM BLUE CROSS BLUE [facility] PPO [id number]     Payor Plan Address Payor Plan Phone Number Payor Plan Fax Number Effective Dates    [address] [phone]  1/1/2022 - None Entered    [facility] [id number]       Subscriber Name Subscriber Birth Date Member ID       KOFI VERA 1940 [id number]                 Emergency Contacts      (Rel.) Home Phone Work Phone Mobile Phone    ChuyKevin flores (Spouse) -- -- [phone]    ELZBIETA VERA (Daughter) -- -- [phone]              "

## 2023-05-18 NOTE — CASE MANAGEMENT/SOCIAL WORK
Continued Stay Note  [facility]     Patient Name: Micki Vera  MRN: [id number]  Today's Date: 5/18/2023    Admit Date: 5/17/2023    Plan: home with spouse and [facility]   Discharge Plan     Row Name 05/18/23 0950       Plan    Plan home with spouse and [facility]    Patient/Family in Agreement with Plan yes    Plan Comments Spoke with patient at bedside. Introduced self and explained role. Facesheet verified. Patient lives with her , Kevin, who will assist at MD. Per careplan, plan is home with [facility].  Referral sent in [facility].  [facility] has accepted and will follow at [facility].  CCP will ofllow as needed.               Discharge Codes    No documentation.               Expected Discharge Date and Time     Expected Discharge Date Expected Discharge Time    May 18, 2023             Radha Garcia RN

## 2023-05-18 NOTE — THERAPY EVALUATION
Patient Name: Micki Vera  : 1940    MRN: [id number]                              Today's Date: 2023       Admit Date: 2023    Visit Dx:     ICD-10-CM ICD-9-CM   1. Total knee replacement status, left  Z96.652 V43.65     Patient Active Problem List   Diagnosis   • OA (osteoarthritis) of knee   • Total knee replacement status, left     Past Medical History:   Diagnosis Date   • Anesthesia complication 2018    BREATHING DIFFICULTIES S/P BREAST SURGERY-TROUBLE WEANING OFF VENT   • Aortic valve disorder    • Asthma    • Breast cancer     RADIATION RIGHT; RADIATION AND CHEMO LEFT(HR2-)   • Coronary artery disease    • Female bladder prolapse     URINE LEAKAGE   • History of COVID-19 2023   • Hyperlipidemia    • Hypertension    • Jaw pain     RIGHT-FOLLOWED BY PCP   • Osteoarthritis    • Peripheral neuropathy    • Poisoning by stingray     RIGHT SECOND TOE   • Pulmonary emboli 2009   • Rotator cuff injury     RIGHT   • Stye     FOLLOWED BY [facility]   • Thyroid nodule    • Varicose vein of leg      Past Surgical History:   Procedure Laterality Date   • AORTIC VALVE REPAIR/REPLACEMENT     • BREAST SURGERY Bilateral    • CARDIAC CATHETERIZATION      STENTS X 2   • EYE SURGERY Bilateral     CATARACT EXT. WITH LENS IMPLANT   • FOOT SURGERY Right     SECOND TOE   • ILIAC ARTERY STENT     • MICROAMBULATORY PHLEBECTOMY Left    • TONSILLECTOMY     • TOTAL KNEE ARTHROPLASTY Left 2023    Procedure: TOTAL KNEE ARTHROPLASTY;  Surgeon: Danny Lorenz MD;  [facility];  Service: Orthopedics;  Laterality: Left;      General Information     Row Name 23 1642          Physical Therapy Time and Intention    Document Type evaluation  -MS     Mode of Treatment physical therapy  -MS     Row Name 23 164          General Information    Patient Profile Reviewed yes  -MS     Prior Level of Function independent:;all household mobility  -MS     Existing  Precautions/Restrictions fall  -MS     Barriers to Rehab none identified  -MS     Row Name 05/18/23 1642          Living Environment    People in Home spouse  -MS     Row Name 05/18/23 1642          Home Main Entrance    Number of Stairs, Main Entrance two  -MS     Stair Railings, Main Entrance railings on both sides of stairs  -MS     Row Name 05/18/23 1642          Cognition    Orientation Status (Cognition) oriented x 4  -MS     Row Name 05/18/23 1642          Safety Issues, Functional Mobility    Impairments Affecting Function (Mobility) strength;endurance/activity tolerance;range of motion (ROM);pain;balance  -MS     Comment, Safety Issues/Impairments (Mobility) Gait belt and non skid socks donned.  -MS           User Key  (r) = Recorded By, (t) = Taken By, (c) = Cosigned By    Initials Name Provider Type    Odalis Flores, PT Physical Therapist               Mobility     Row Name 05/18/23 1644          Bed Mobility    Bed Mobility supine-sit  -MS     Supine-Sit [place] (Bed Mobility) supervision;verbal cues  -MS     Row Name 05/18/23 1644          Sit-Stand Transfer    Sit-Stand [place] (Transfers) standby assist;contact guard;verbal cues  -MS     Assistive Device (Sit-Stand Transfers) walker, front-wheeled  -MS     Row Name 05/18/23 1644          Gait/Stairs (Locomotion)    [place] Level (Gait) standby assist;verbal cues  -MS     Assistive Device (Gait) walker, front-wheeled  -MS     Distance in Feet (Gait) 80'  -MS     [place] Level (Stairs) contact guard;verbal cues  -MS     Handrail Location (Stairs) both sides  -MS     Number of Steps (Stairs) 2  -MS     Ascending Technique (Stairs) step-to-step  -MS     Descending Technique (Stairs) step-to-step  -MS     Comment, (Gait/Stairs) No overt LOB or veering noted. Stair training completed with no issues.  -MS           User Key  (r) = Recorded By, (t) = Taken By, (c) = Cosigned By    Initials Name Provider Type    Odalis Flores  A, PT Physical Therapist               Obj/Interventions     Row Name 05/18/23 1644          Range of Motion Comprehensive    Comment, General Range of Motion L LE Limited 2/2 pain  -MS     Row Name 05/18/23 1644          Strength Comprehensive (MMT)    Comment, General Manual Muscle Testing (MMT) Assessment L LE post op weakness  -MS     Row Name 05/18/23 1644          Motor Skills    Therapeutic Exercise --  L TKA protocol x 10 reps  -MS     Row Name 05/18/23 1644          Balance    Balance Assessment sitting static balance;standing static balance  -MS     Static Sitting Balance supervision  -MS     Position, Sitting Balance sitting edge of bed  -MS     Static Standing Balance contact guard  -MS     Position/Device Used, Standing Balance supported;walker, rolling  -MS     Row Name 05/18/23 1644          Sensory Assessment (Somatosensory)    Sensory Assessment (Somatosensory) sensation intact  -MS           User Key  (r) = Recorded By, (t) = Taken By, (c) = Cosigned By    Initials Name Provider Type    MS ChilelOdalis A, PT Physical Therapist               Goals/Plan    No documentation.                Clinical Impression     Row Name 05/18/23 1645          Pain    Pretreatment Pain Rating 4/10  -MS     Posttreatment Pain Rating 4/10  -MS     Pain Location - Side/Orientation Left  -MS     Pain Location lower  -MS     Pain Location - extremity  -MS     Pain Intervention(s) Repositioned;Ambulation/increased activity;Rest  -MS     Row Name 05/18/23 1645          Therapy Assessment/Plan (PT)    Therapy Frequency (PT) evaluation only  -MS     Row Name 05/18/23 1645          Vital Signs    O2 Delivery Pre Treatment room air  -MS     Row Name 05/18/23 1645          Positioning and Restraints    Pre-Treatment Position in bed  -MS     Post Treatment Position chair  -MS     In Chair notified nsg;reclined;call light within reach;encouraged to call for assist;exit alarm on;with family/caregiver  -MS           User Key   (r) = Recorded By, (t) = Taken By, (c) = Cosigned By    Initials Name Provider Type    MS ChilelOdalis, PT Physical Therapist               Outcome Measures     Row Name 05/18/23 1645          5 Meter Walk    Was an Assistive Device Used? Yes (comment)  -MS     Row Name 05/18/23 1645 05/18/23 0856       How much help from another person do you currently need...    Turning from your back to your side while in flat bed without using bedrails? 4  -MS 4  -CORRY    Moving from lying on back to sitting on the side of a flat bed without bedrails? 4  -MS 4  -CORRY    Moving to and from a bed to a chair (including a wheelchair)? 3  -MS 3  -CORRY    Standing up from a chair using your arms (e.g., wheelchair, bedside chair)? 3  -MS 3  -CORRY    Climbing 3-5 steps with a railing? 3  -MS 3  -CORRY    To walk in hospital room? 3  -MS 3  -CORRY    AM-PAC 6 Clicks Score (PT) 20  -MS 20  -CORRY    Highest level of mobility 6 --> Walked 10 steps or more  -MS 6 --> Walked 10 steps or more  -CORRY          User Key  (r) = Recorded By, (t) = Taken By, (c) = Cosigned By    Initials Name Provider Type    MS ChilelOdalis, PT Physical Therapist    Florencio Rodriguez RN Registered Nurse                               PT Recommendation and Plan           Time Calculation:    PT Charges     Row Name 05/18/23 1641             Time Calculation    Start Time 0919  -MS      Stop Time 0949  -MS      Time Calculation (min) 30 min  -MS      PT Received On 05/18/23  -MS            User Key  (r) = Recorded By, (t) = Taken By, (c) = Cosigned By    Initials Name Provider Type    MS ChilelOdalis, PT Physical Therapist              Therapy Charges for Today     Code Description Service Date Service Provider Modifiers Qty    39733100171 HC PT EVAL LOW COMPLEXITY 2 5/18/2023 Odalis Chilel, PT GP 1    04998213455 HC PT THER PROC EA 15 MIN 5/18/2023 Odalis Chilel, PT GP 2          PT G-Codes  AM-PAC 6 Clicks Score (PT): 20  PT Discharge Summary  Anticipated  Discharge Disposition (PT): home with assist, home with home health    Odalis Chilel, PT  5/18/2023

## 2023-05-19 NOTE — CASE MANAGEMENT/SOCIAL WORK
Case Management Discharge Note      Final Note: Patient discharged home with [facility]. Spouse to transport. Sandie BETHEA LCSW         Selected Continued Care - Discharged on 5/18/2023 Admission date: 5/17/2023 - Discharge disposition: Home-Health Care [facility]    Destination    No services have been selected for the patient.              Durable Medical Equipment    No services have been selected for the patient.              Dialysis/Infusion    No services have been selected for the patient.              Home Medical Care Coordination complete.    Service Provider Selected Services Address Phone Fax Patient Preferred    [facility] [address] [place] [address] [phone] --          Therapy    No services have been selected for the patient.              Community Resources    No services have been selected for the patient.              Community & DME    No services have been selected for the patient.                  Transportation Services  Private: Car    Final Discharge Disposition Code: 01 - home or self-care

## 2023-05-24 ENCOUNTER — TELEPHONE (OUTPATIENT)
Dept: ORTHOPEDIC SURGERY | Facility: HOSPITAL | Age: 83
End: 2023-05-24
Payer: MEDICARE

## 2023-05-24 NOTE — TELEPHONE ENCOUNTER
Called and spoke with Ms. Vera to see how she is doing as she is 1 week SP TKA. She said it's going slow, but she is getting better. The first few days were really rough, but she feels she has turned a corner and she feels better. She is working with PT. She is doing her exercises and walking. Her leg is straight now, and she is grateful for that. She was able to go outside and sit on the patio. Pain is controlled. She is weaning off the medication. Taking mostly Tylenol and the pain pills when needed. She was finally able to have a BM, that was causing her a lot of trouble. Last night was the first time she got a decent nights sleep. Ms. Vera doesn't have any questions for me at this time. She was given my contact information should she need anything.

## (undated) DEVICE — SUT ETHIB 1 CT1 30IN  X425H

## (undated) DEVICE — NEEDLE, QUINCKE, 20GX3.5": Brand: MEDLINE

## (undated) DEVICE — STRAP STIRUP WO/ RNG

## (undated) DEVICE — GLV SURG SENSICARE PI PF LF 8.5 GRN STRL

## (undated) DEVICE — PK KN TOTL 40

## (undated) DEVICE — DRSNG SLVR/ANTIBAC PRIMASEAL POST/OP ADHS 3.5X12IN

## (undated) DEVICE — TBG PENCL TELESCP MEGADYNE SMOKE EVAC 10FT

## (undated) DEVICE — 3M™ IOBAN™ 2 ANTIMICROBIAL INCISE DRAPE 6640EZ: Brand: IOBAN™ 2

## (undated) DEVICE — DECANTER BAG 9": Brand: MEDLINE INDUSTRIES, INC.

## (undated) DEVICE — STERILE PATIENT PROTECTIVE PAD FOR IMP® KNEE POSITIONERS & COHESIVE WRAP (10 / CASE): Brand: DE MAYO KNEE POSITIONER®

## (undated) DEVICE — ANTIBACTERIAL UNDYED BRAIDED (POLYGLACTIN 910), SYNTHETIC ABSORBABLE SUTURE: Brand: COATED VICRYL

## (undated) DEVICE — GLV SURG BIOGEL LTX PF 8

## (undated) DEVICE — APPL CHLORAPREP HI/LITE 26ML ORNG

## (undated) DEVICE — BNDG ELAS ELITE V/CLOSE 6IN 5YD LF STRL

## (undated) DEVICE — BNDG ELAS ELITE V/CLOSE 4IN 5YD LF STRL

## (undated) DEVICE — GLV SURG BIOGEL LTX PF 8 1/2

## (undated) DEVICE — TRAP FLD MINIVAC MEGADYNE 100ML

## (undated) DEVICE — GLV SURG PREMIERPRO ORTHO LTX PF SZ8 BRN

## (undated) DEVICE — DUAL CUT SAGITTAL BLADE

## (undated) DEVICE — MAT FLR ABSORBENT LG 4FT 10 2.5FT

## (undated) DEVICE — GLV SURG SIGNATURE ESSENTIAL PF LTX SZ8

## (undated) DEVICE — SOL ISO/ALC 70PCT 4OZ

## (undated) DEVICE — ADHS SKIN SURG TISS VISC PREMIERPRO EXOFIN HI/VISC FAST/DRY

## (undated) DEVICE — SOL IRR NACL 0.9PCT 3000ML

## (undated) DEVICE — PREP SOL POVIDONE/IODINE BT 4OZ

## (undated) DEVICE — RECIPROCATING BLADE, DOUBLE SIDED, OFFSET  (70.0 X 0.64 X 12.6MM)

## (undated) DEVICE — DRAPE,U/ SHT,SPLIT,PLAS,STERIL: Brand: MEDLINE